# Patient Record
Sex: FEMALE | Race: WHITE | Employment: UNEMPLOYED | URBAN - METROPOLITAN AREA
[De-identification: names, ages, dates, MRNs, and addresses within clinical notes are randomized per-mention and may not be internally consistent; named-entity substitution may affect disease eponyms.]

---

## 2018-10-04 LAB
HBV SURFACE AG SERPL QL IA: NORMAL
HIV 1+2 AB+HIV1 P24 AG SERPL QL IA: NORMAL
RUBELLA ANTIBODY IGG QUANTITATIVE: NORMAL IU/ML

## 2018-11-09 ENCOUNTER — OFFICE VISIT (OUTPATIENT)
Dept: FAMILY MEDICINE | Facility: CLINIC | Age: 33
End: 2018-11-09
Payer: COMMERCIAL

## 2018-11-09 VITALS
WEIGHT: 157.8 LBS | DIASTOLIC BLOOD PRESSURE: 86 MMHG | SYSTOLIC BLOOD PRESSURE: 131 MMHG | BODY MASS INDEX: 24.77 KG/M2 | HEIGHT: 67 IN | HEART RATE: 78 BPM | TEMPERATURE: 98.3 F

## 2018-11-09 DIAGNOSIS — L03.011 PARONYCHIA OF RIGHT THUMB: Primary | ICD-10-CM

## 2018-11-09 PROCEDURE — 99203 OFFICE O/P NEW LOW 30 MIN: CPT | Performed by: NURSE PRACTITIONER

## 2018-11-09 RX ORDER — PRENATAL VIT/IRON FUM/FOLIC AC 27MG-0.8MG
1 TABLET ORAL DAILY
COMMUNITY

## 2018-11-09 RX ORDER — CEPHALEXIN 500 MG/1
500 CAPSULE ORAL 3 TIMES DAILY
Qty: 30 CAPSULE | Refills: 0 | Status: ON HOLD | OUTPATIENT
Start: 2018-11-09 | End: 2019-02-12

## 2018-11-09 NOTE — PATIENT INSTRUCTIONS
1. Take Keflex for 10 days (three times a day).  2. Make sure you take the entire course.  3. Come back if this is not resolving in the next week or so.   4. Put hot packs on it.

## 2018-11-09 NOTE — PROGRESS NOTES
"  SUBJECTIVE:                                                      Parul Herrera is a 33 year old female who presents to clinic today for the following health issues:    Joint Pain    Onset: Monday    Description:   Location: Right thumb  Character: Throbs on and off    Intensity: moderate    Progression of Symptoms: same    Accompanying Signs & Symptoms:  Other symptoms: warmth, swelling, redness and discoloration    History:   Previous similar pain: no       Precipitating factors:   Trauma or overuse: no     Alleviating factors:  Improved by: Lancing    Therapies Tried and outcome: Lanced it     HPI: Beginning on Monday, Parul noticed the development of pain, redness, and warmth near the nail of her right thumb. No trauma recalled, although she did have a hangnail a few days before. Her symptoms intensified during the first part of this week, and last night it became extremely tender, throbbing, taut, and hot to the touch. She lanced it with a needle, and pus ran out. This brought relief, but it is still a little warm and tender today. No sign of spread (clear line at knuckle level - has not changed). No MRSA history. Of note, she is 13 weeks pregnant.     Problem list and histories reviewed & adjusted, as indicated.  Additional history: as documented    Reviewed and updated as needed this visit by clinical staff  Tobacco  Allergies  Meds  Problems  Med Hx  Surg Hx  Fam Hx  Soc Hx        Reviewed and updated as needed this visit by Provider  Allergies  Meds  Problems         ROS:  Constitutional, musculoskeletal, skin systems are negative, except as otherwise noted.    OBJECTIVE:                                                      /86 (BP Location: Right arm, Patient Position: Chair, Cuff Size: Adult Regular)  Pulse 78  Temp 98.3  F (36.8  C) (Tympanic)  Ht 5' 7.32\" (1.71 m)  Wt 157 lb 12.8 oz (71.6 kg)  LMP  (LMP Unknown)  Breastfeeding? No  BMI 24.48 kg/m2 Body mass index is 24.48 kg/(m^2). "   GENERAL: healthy, alert, well nourished, well hydrated, no distress  SKIN: Right thumb (lateral aspect) - warm, erythematous region with well-demarcated border starting at knuckle and progressing distally to tip of thumb. Extends medially across base of nail. Not enough purulent material to justify drainage today (she states she milked it all out last night). No evidence of expansion of infection proximally.     Diagnostic test results:  none     ASSESSMENT/PLAN:                                                      Parul was seen today for thumb pain and redness. Presentation consistent with paronychia. I & D not indicated today (no longer fluctuant). Will treat with Keflex for 10 days (no concern for MRSA at this point). Checked for safety in pregnancy. Recommended hot-packing area. She agrees to come back if this is not looking better by the end of next week (or sooner if she develops fever / chills or it appears that the infection is spreading). Discussed risks, benefits, alternatives, and potential side effects of new medication / treatment. Agrees with plan of care. All questions answered.     Diagnoses and all orders for this visit:    Paronychia of right thumb  -     cephALEXin (KEFLEX) 500 MG capsule; Take 1 capsule (500 mg) by mouth 3 times daily    Risks, benefits and alternatives of treatments discussed. Plan agreed upon and all questions answered.      Follow-Up: Return in about 10 days (around 11/19/2018).    See Patient Instructions      LADAN Saini, CNP

## 2018-11-09 NOTE — MR AVS SNAPSHOT
"              After Visit Summary   11/9/2018    Parul Herrera    MRN: [id number]           Patient Information     Date Of Birth          1985        Visit Information        Provider Department      11/9/2018 3:00 PM Boo Rodriguez NP [facility]        Today's Diagnoses     Paronychia of right thumb    -  1      Care Instructions    1. Take Keflex for 10 days (three times a day).  2. Make sure you take the entire course.  3. Come back if this is not resolving in the next week or so.   4. Put hot packs on it.           Follow-ups after your visit        Follow-up notes from your care team     Return in about 10 days (around 11/19/2018).      Who to contact     If you have questions or need follow up information about today's clinic visit or your schedule please contact [facility] directly at [phone].  Normal or non-critical lab and imaging results will be communicated to you by MyChart, letter or phone within 4 business days after the clinic has received the results. If you do not hear from us within 7 days, please contact the clinic through [employer] or phone. If you have a critical or abnormal lab result, we will notify you by phone as soon as possible.  Submit refill requests through [employer] or call your pharmacy and they will forward the refill request to us. Please allow 3 business days for your refill to be completed.          Additional Information About Your Visit        MyChart Information     [employer] lets you send messages to your doctor, view your test results, renew your prescriptions, schedule appointments and more. To sign up, go to [email or web address] . Click on \"Log in\" on the left side of the screen, which will take you to the Welcome page. Then click on \"Sign up Now\" on the right side of the page.     You will be asked to enter the access code listed below, as well as some personal information. Please follow the directions to create your " "username and password.     Your access code is: [email or web address]  Expires: 2019  3:22 PM     Your access code will  in 90 days. If you need help or a new code, please call your [facility] clinic or [phone].        Care EveryWhere ID     This is your Care EveryWhere ID. This could be used by other organizations to access your [facility] medical records  [id number]        Your Vitals Were     Pulse Temperature Height Last Period Breastfeeding? BMI (Body Mass Index)    78 98.3  F (36.8  C) (Tympanic) 5' 7.32\" (1.71 m) (LMP Unknown) No 24.48 kg/m2       Blood Pressure from Last 3 Encounters:   18 131/86    Weight from Last 3 Encounters:   18 157 lb 12.8 oz (71.6 kg)              Today, you had the following     No orders found for display         Today's Medication Changes          These changes are accurate as of 18  3:22 PM.  If you have any questions, ask your nurse or doctor.               Start taking these medicines.        Dose/Directions    cephALEXin 500 MG capsule   Commonly known as:  KEFLEX   Used for:  Paronychia of right thumb   Started by:  Boo Rodriguez NP        Dose:  500 mg   Take 1 capsule (500 mg) by mouth 3 times daily   Quantity:  30 capsule   Refills:  0            Where to get your medicines      These medications were sent to [facility] [place] [address], [place] [address]     Phone:  [phone]     cephALEXin 500 MG capsule                Primary Care Provider Office Phone # Fax #    Boo Rodriguez [phone]       [address] [place] [address] [address]  [place] [address]        Equal Access to Services     YENI TIERNEY : Hadii bonnie Huynh, waaxda luqadaha, qaybta kaalmada klaudiayaleidy, nam jay. So [facility] [phone].    ATENCIÓN: Si habla español, tiene a fenton disposición servicios gratuitos de asistencia lingüística. Llame al " [phone].    We comply with applicable federal civil rights laws and [place] laws. We do not discriminate on the basis of race, color, national origin, age, disability, sex, sexual orientation, or gender identity.            Thank you!     Thank you for choosing [facility] [place]  for your care. Our goal is always to provide you with excellent care. Hearing back from our patients is one way we can continue to improve our services. Please take a few minutes to complete the written survey that you may receive in the mail after your visit with us. Thank you!             Your Updated Medication List - Protect others around you: Learn how to safely use, store and throw away your medicines at www.disposemymeds.org.          This list is accurate as of 11/9/18  3:22 PM.  Always use your most recent med list.                   Brand Name Dispense Instructions for use Diagnosis    cephALEXin 500 MG capsule    KEFLEX    30 capsule    Take 1 capsule (500 mg) by mouth 3 times daily    Paronychia of right thumb       prenatal multivitamin plus iron 27-0.8 MG Tabs per tablet      Take 1 tablet by mouth daily

## 2018-11-14 ENCOUNTER — HEALTH MAINTENANCE LETTER (OUTPATIENT)
Age: 33
End: 2018-11-14

## 2019-01-29 ENCOUNTER — PRE VISIT (OUTPATIENT)
Dept: MATERNAL FETAL MEDICINE | Facility: CLINIC | Age: 34
End: 2019-01-29

## 2019-01-29 ENCOUNTER — HOSPITAL ENCOUNTER (OUTPATIENT)
Dept: ULTRASOUND IMAGING | Facility: CLINIC | Age: 34
Discharge: HOME | End: 2019-01-29
Attending: INTERNAL MEDICINE | Admitting: INTERNAL MEDICINE
Payer: COMMERCIAL

## 2019-01-29 ENCOUNTER — OFFICE VISIT (OUTPATIENT)
Dept: MATERNAL FETAL MEDICINE | Facility: CLINIC | Age: 34
End: 2019-01-29
Attending: OBSTETRICS & GYNECOLOGY
Payer: COMMERCIAL

## 2019-01-29 ENCOUNTER — TRANSCRIBE ORDERS (OUTPATIENT)
Dept: MATERNAL FETAL MEDICINE | Facility: CLINIC | Age: 34
End: 2019-01-29

## 2019-01-29 DIAGNOSIS — O26.90 PREGNANCY RELATED CONDITION, ANTEPARTUM: Primary | ICD-10-CM

## 2019-01-29 DIAGNOSIS — O35.CXX0 PLEURAL EFFUSION, FETAL, AFFECTING CARE OF MOTHER, ANTEPARTUM: Primary | ICD-10-CM

## 2019-01-29 DIAGNOSIS — O26.90 PREGNANCY RELATED CONDITION, ANTEPARTUM: ICD-10-CM

## 2019-01-29 PROCEDURE — 76821 MIDDLE CEREBRAL ARTERY ECHO: CPT

## 2019-01-29 PROCEDURE — 76811 OB US DETAILED SNGL FETUS: CPT

## 2019-01-29 PROCEDURE — 96372 THER/PROPH/DIAG INJ SC/IM: CPT | Mod: ZF

## 2019-01-29 PROCEDURE — 25000128 H RX IP 250 OP 636: Mod: ZF | Performed by: OBSTETRICS & GYNECOLOGY

## 2019-01-29 RX ORDER — BETAMETHASONE SODIUM PHOSPHATE AND BETAMETHASONE ACETATE 3; 3 MG/ML; MG/ML
12 INJECTION, SUSPENSION INTRA-ARTICULAR; INTRALESIONAL; INTRAMUSCULAR; SOFT TISSUE ONCE
Status: COMPLETED | OUTPATIENT
Start: 2019-01-29 | End: 2019-01-29

## 2019-01-29 RX ADMIN — BETAMETHASONE SODIUM PHOSPHATE AND BETAMETHASONE ACETATE 12 MG: 3; 3 INJECTION, SUSPENSION INTRA-ARTICULAR; INTRALESIONAL; INTRAMUSCULAR at 16:20

## 2019-01-30 ENCOUNTER — HOSPITAL ENCOUNTER (OUTPATIENT)
Dept: ULTRASOUND IMAGING | Facility: CLINIC | Age: 34
Discharge: HOME | End: 2019-01-30
Attending: OBSTETRICS & GYNECOLOGY | Admitting: OBSTETRICS & GYNECOLOGY
Payer: COMMERCIAL

## 2019-01-30 ENCOUNTER — OFFICE VISIT (OUTPATIENT)
Dept: MATERNAL FETAL MEDICINE | Facility: CLINIC | Age: 34
End: 2019-01-30
Attending: OBSTETRICS & GYNECOLOGY
Payer: COMMERCIAL

## 2019-01-30 ENCOUNTER — HOSPITAL ENCOUNTER (OUTPATIENT)
Dept: CARDIOLOGY | Facility: CLINIC | Age: 34
End: 2019-01-30
Attending: OBSTETRICS & GYNECOLOGY | Admitting: OBSTETRICS & GYNECOLOGY
Payer: COMMERCIAL

## 2019-01-30 DIAGNOSIS — O35.CXX0 PLEURAL EFFUSION, FETAL, AFFECTING CARE OF MOTHER, ANTEPARTUM: ICD-10-CM

## 2019-01-30 DIAGNOSIS — O35.CXX0 PLEURAL EFFUSION, FETAL, AFFECTING CARE OF MOTHER, ANTEPARTUM: Primary | ICD-10-CM

## 2019-01-30 PROCEDURE — 96372 THER/PROPH/DIAG INJ SC/IM: CPT | Mod: ZF

## 2019-01-30 PROCEDURE — 96372 THER/PROPH/DIAG INJ SC/IM: CPT

## 2019-01-30 PROCEDURE — 76825 ECHO EXAM OF FETAL HEART: CPT

## 2019-01-30 PROCEDURE — 76815 OB US LIMITED FETUS(S): CPT

## 2019-01-30 PROCEDURE — 25000128 H RX IP 250 OP 636: Mod: ZF | Performed by: OBSTETRICS & GYNECOLOGY

## 2019-01-30 RX ORDER — BETAMETHASONE SODIUM PHOSPHATE AND BETAMETHASONE ACETATE 3; 3 MG/ML; MG/ML
12 INJECTION, SUSPENSION INTRA-ARTICULAR; INTRALESIONAL; INTRAMUSCULAR; SOFT TISSUE ONCE
Status: DISCONTINUED | OUTPATIENT
Start: 2019-01-30 | End: 2019-01-30

## 2019-01-30 RX ADMIN — BETAMETHASONE SODIUM PHOSPHATE AND BETAMETHASONE ACETATE 12 MG: 3; 3 INJECTION, SUSPENSION INTRA-ARTICULAR; INTRALESIONAL; INTRAMUSCULAR at 16:11

## 2019-01-30 NOTE — PROGRESS NOTES
"Please see \"Imaging\" tab under \"Chart Review\" for details of today's US.    Nanci Alcazar, DO    "

## 2019-01-31 ENCOUNTER — HOSPITAL ENCOUNTER (OUTPATIENT)
Facility: CLINIC | Age: 34
Discharge: HOME | End: 2019-01-31
Attending: OBSTETRICS & GYNECOLOGY | Admitting: OBSTETRICS & GYNECOLOGY
Payer: COMMERCIAL

## 2019-01-31 ENCOUNTER — HOSPITAL ENCOUNTER (OUTPATIENT)
Dept: ULTRASOUND IMAGING | Facility: CLINIC | Age: 34
End: 2019-01-31
Payer: COMMERCIAL

## 2019-01-31 ENCOUNTER — ANESTHESIA (OUTPATIENT)
Dept: OBGYN | Facility: CLINIC | Age: 34
End: 2019-01-31
Payer: COMMERCIAL

## 2019-01-31 ENCOUNTER — ANESTHESIA EVENT (OUTPATIENT)
Dept: OBGYN | Facility: CLINIC | Age: 34
End: 2019-01-31
Payer: COMMERCIAL

## 2019-01-31 VITALS
DIASTOLIC BLOOD PRESSURE: 58 MMHG | HEIGHT: 68 IN | BODY MASS INDEX: 25.01 KG/M2 | OXYGEN SATURATION: 100 % | WEIGHT: 165 LBS | RESPIRATION RATE: 16 BRPM | HEART RATE: 88 BPM | SYSTOLIC BLOOD PRESSURE: 110 MMHG | TEMPERATURE: 97.3 F

## 2019-01-31 LAB
ABO + RH BLD: NORMAL
ABO + RH BLD: NORMAL
APPEARANCE FLD: NORMAL
BLD GP AB SCN SERPL QL: NORMAL
BLOOD BANK CMNT PATIENT-IMP: NORMAL
COLOR FLD: YELLOW
ERYTHROCYTE [DISTWIDTH] IN BLOOD BY AUTOMATED COUNT: 12.8 % (ref 10–15)
HCT VFR BLD AUTO: 35.9 % (ref 35–47)
HGB BLD-MCNC: 12 G/DL (ref 11.7–15.7)
LYMPHOCYTES NFR FLD MANUAL: 24 %
MCH RBC QN AUTO: 29 PG (ref 26.5–33)
MCHC RBC AUTO-ENTMCNC: 33.4 G/DL (ref 31.5–36.5)
MCV RBC AUTO: 87 FL (ref 78–100)
MISCELLANEOUS TEST: NORMAL
MONOS+MACROS NFR FLD MANUAL: 76 %
PLATELET # BLD AUTO: 246 10E9/L (ref 150–450)
RBC # BLD AUTO: 4.14 10E12/L (ref 3.8–5.2)
SPECIMEN EXP DATE BLD: NORMAL
SPECIMEN SOURCE FLD: NORMAL
WBC # BLD AUTO: 24.2 10E9/L (ref 4–11)
WBC # FLD AUTO: 2130 /UL

## 2019-01-31 PROCEDURE — 84999 UNLISTED CHEMISTRY PROCEDURE: CPT | Mod: 91 | Performed by: OBSTETRICS & GYNECOLOGY

## 2019-01-31 PROCEDURE — 40000169 ZZH STATISTIC PRE-PROCEDURE ASSESSMENT I: Performed by: OBSTETRICS & GYNECOLOGY

## 2019-01-31 PROCEDURE — 88275 CYTOGENETICS 100-300: CPT | Mod: 91 | Performed by: OBSTETRICS & GYNECOLOGY

## 2019-01-31 PROCEDURE — 86850 RBC ANTIBODY SCREEN: CPT | Performed by: STUDENT IN AN ORGANIZED HEALTH CARE EDUCATION/TRAINING PROGRAM

## 2019-01-31 PROCEDURE — 71000012 ZZH RECOVERY PHASE 1 LEVEL 1 FIRST HR: Performed by: OBSTETRICS & GYNECOLOGY

## 2019-01-31 PROCEDURE — 37000009 ZZH ANESTHESIA TECHNICAL FEE, EACH ADDTL 15 MIN: Performed by: OBSTETRICS & GYNECOLOGY

## 2019-01-31 PROCEDURE — 88291 CYTO/MOLECULAR REPORT: CPT | Performed by: OBSTETRICS & GYNECOLOGY

## 2019-01-31 PROCEDURE — 99214 OFFICE O/P EST MOD 30 MIN: CPT | Performed by: NURSE PRACTITIONER

## 2019-01-31 PROCEDURE — 87529 HSV DNA AMP PROBE: CPT | Performed by: OBSTETRICS & GYNECOLOGY

## 2019-01-31 PROCEDURE — 37000008 ZZH ANESTHESIA TECHNICAL FEE, 1ST 30 MIN: Performed by: OBSTETRICS & GYNECOLOGY

## 2019-01-31 PROCEDURE — 87070 CULTURE OTHR SPECIMN AEROBIC: CPT | Performed by: OBSTETRICS & GYNECOLOGY

## 2019-01-31 PROCEDURE — 36000059 ZZH SURGERY LEVEL 3 EA 15 ADDTL MIN UMMC: Performed by: OBSTETRICS & GYNECOLOGY

## 2019-01-31 PROCEDURE — 86901 BLOOD TYPING SEROLOGIC RH(D): CPT | Performed by: STUDENT IN AN ORGANIZED HEALTH CARE EDUCATION/TRAINING PROGRAM

## 2019-01-31 PROCEDURE — 87798 DETECT AGENT NOS DNA AMP: CPT | Performed by: OBSTETRICS & GYNECOLOGY

## 2019-01-31 PROCEDURE — 81265 STR MARKERS SPECIMEN ANAL: CPT

## 2019-01-31 PROCEDURE — 59074 FETAL FLUID DRAINAGE W/US: CPT | Performed by: OBSTETRICS & GYNECOLOGY

## 2019-01-31 PROCEDURE — 81229 CYTOG ALYS CHRML ABNR SNPCGH: CPT | Performed by: OBSTETRICS & GYNECOLOGY

## 2019-01-31 PROCEDURE — 36415 COLL VENOUS BLD VENIPUNCTURE: CPT | Performed by: STUDENT IN AN ORGANIZED HEALTH CARE EDUCATION/TRAINING PROGRAM

## 2019-01-31 PROCEDURE — 86900 BLOOD TYPING SEROLOGIC ABO: CPT | Performed by: STUDENT IN AN ORGANIZED HEALTH CARE EDUCATION/TRAINING PROGRAM

## 2019-01-31 PROCEDURE — 36000057 ZZH SURGERY LEVEL 3 1ST 30 MIN - UMMC: Performed by: OBSTETRICS & GYNECOLOGY

## 2019-01-31 PROCEDURE — 89051 BODY FLUID CELL COUNT: CPT | Performed by: OBSTETRICS & GYNECOLOGY

## 2019-01-31 PROCEDURE — 85027 COMPLETE CBC AUTOMATED: CPT | Performed by: STUDENT IN AN ORGANIZED HEALTH CARE EDUCATION/TRAINING PROGRAM

## 2019-01-31 PROCEDURE — 25000128 H RX IP 250 OP 636: Performed by: STUDENT IN AN ORGANIZED HEALTH CARE EDUCATION/TRAINING PROGRAM

## 2019-01-31 PROCEDURE — 88271 CYTOGENETICS DNA PROBE: CPT | Performed by: OBSTETRICS & GYNECOLOGY

## 2019-01-31 RX ORDER — SODIUM CHLORIDE, SODIUM LACTATE, POTASSIUM CHLORIDE, CALCIUM CHLORIDE 600; 310; 30; 20 MG/100ML; MG/100ML; MG/100ML; MG/100ML
INJECTION, SOLUTION INTRAVENOUS CONTINUOUS
Status: DISCONTINUED | OUTPATIENT
Start: 2019-01-31 | End: 2019-01-31 | Stop reason: HOSPADM

## 2019-01-31 RX ORDER — CEFAZOLIN SODIUM 1 G/3ML
1 INJECTION, POWDER, FOR SOLUTION INTRAMUSCULAR; INTRAVENOUS SEE ADMIN INSTRUCTIONS
Status: DISCONTINUED | OUTPATIENT
Start: 2019-01-31 | End: 2019-01-31 | Stop reason: HOSPADM

## 2019-01-31 RX ORDER — CEFAZOLIN SODIUM 2 G/100ML
2 INJECTION, SOLUTION INTRAVENOUS
Status: COMPLETED | OUTPATIENT
Start: 2019-01-31 | End: 2019-01-31

## 2019-01-31 RX ADMIN — SODIUM CHLORIDE, POTASSIUM CHLORIDE, SODIUM LACTATE AND CALCIUM CHLORIDE: 600; 310; 30; 20 INJECTION, SOLUTION INTRAVENOUS at 12:06

## 2019-01-31 RX ADMIN — CEFAZOLIN SODIUM 2 G: 2 INJECTION, SOLUTION INTRAVENOUS at 12:07

## 2019-01-31 ASSESSMENT — MIFFLIN-ST. JEOR: SCORE: 1511.94

## 2019-01-31 NOTE — PLAN OF CARE
Pt and  to BP for fetal surgery r/t pleural effusion. [place] to room, call light, food and ordering, BOR, monitoring, IV start, lab draw, use of white board. Enc pt and  to ask questions. Complete intake paperwork. MDs in for US and to discuss plan, NNP here now discussing 25 wk babies.

## 2019-01-31 NOTE — PLAN OF CARE
Pt radha procedure well, delighted to still be pregnant. Initial mild cramping, none now. Ate lunch, void without difficulty. IV saline locked.  and father in law at bedside.

## 2019-01-31 NOTE — OP NOTE
Fetal Thoracentesis / Amniocentesis    Pre-Operative Diagnosis:   32 yo  @ 25w0d with fetal bilateral pleural effusions seen on ultrasound    Post-Operative Diagnosis:   Same, good FHT (140s) at conclusion of the procedure    Procedure: Fetal thoracentesis, Amniocentesis  Surgeon: Dr. Sethi  Assist: Dr. Michelle Walsh MD, MFM fellow  Anesth: vecuronium (0.09 mg IM to fetal buttocks)  EBL: 0cc  Specimen: amniotic fluid, pleural fluid  Proph: SCDs   Findings: cephalic infant, anterior/fundal placenta    Indications: This is a 34yo  at 25w0d  who was found to have bilateral fetal hydrothorax on ultrasound.  She has already had negative CMV testing. Given this finding diagnostic testing with thoracentesis and amniocentesis was discussed with the patient. After review of risks/benefits, the patient decided to proceed with thoracentesis and amniocentesis, with possible  section in the event of non-reassuring fetal status. Risks were reviewed, questions answered and consent signed for the procedure and genetic testing.     Procedure: The patient was taken to the OR. She was prepped and draped in the normal sterile fashion, dorsal supine position. The patient received 1g of Ancef pre-procedure. Under ultrasound guidance, the fetus was noted to be in cephalic presentation. Using a 22 gauge spinal needle under ultrasound guidance with a needle guide, the needle was first advanced percutaneously, through the uterus and into the fetal buttocks. 0.09 mg of vecuronium was injected to obtain adequate fetal paralysis. The needle was then withdrawn back to the level of the amniotic fluid and repositioned to perform the fetal thoracentesis for which approximately 20 ml of straw colored yellow pleural fluid was obtained. The fluid was sent for cell count and differential. The needle was then withdrawn and the amniocentesis was performed with a new 22 gauge needle for ~50mL of clear yellow  fluid. This was sent for genetic and viral testing. The needle was removed under ultrasound guidance. FHT was noted to be 140s post procedure. Binh Sethi and Monique were present and scrubbed for the procedure.     Erika Walsh MD    Physician Attestation   I was present and participated in the entire procedure of fetal thoracentesis and amniocentesis.    Josef Sethi  Date of Service (when I saw the patient): 1/31/19

## 2019-01-31 NOTE — ANESTHESIA PREPROCEDURE EVALUATION
"Anesthesia Pre-Procedure Evaluation    Patient: Parul Herrera   MRN:     [id number] Gender:   female   Age:    33 year old :      1985        Preoperative Diagnosis: Pregnancy   Procedure(s):  FETAL INJECTION W/ OR W/O ULTRASOUND GUIDANCE     No past medical history on file.   No past surgical history on file.       Anesthesia Evaluation     .             ROS/MED HX    ENT/Pulmonary:  - neg pulmonary ROS     Neurologic:  - neg neurologic ROS     Cardiovascular:  - neg cardiovascular ROS       METS/Exercise Tolerance:     Hematologic:  - neg hematologic  ROS       Musculoskeletal:  - neg musculoskeletal ROS       GI/Hepatic:  - neg GI/hepatic ROS       Renal/Genitourinary:         Endo:  - neg endo ROS       Psychiatric:  - neg psychiatric ROS       Infectious Disease:  - neg infectious disease ROS       Malignancy:         Other:                         PHYSICAL EXAM:   Mental Status/Neuro: A/A/O   Airway: Facies: Feasible  Mallampati: I  Mouth/Opening: Full  TM distance: > 6 cm  Neck ROM: Full   Respiratory:    CV:    Comments:                    No results found for: WBC, HGB, HCT, PLT, CRP, SED, NA, POTASSIUM, CHLORIDE, CO2, BUN, CR, GLC, VEE, PHOS, MAG, ALBUMIN, PROTTOTAL, ALT, AST, GGT, ALKPHOS, BILITOTAL, BILIDIRECT, LIPASE, AMYLASE, NEFTALI, PTT, INR, FIBR, TSH, T4, T3, HCG, HCGS, CKTOTAL, CKMB, TROPN    Preop Vitals  BP Readings from Last 3 Encounters:   18 131/86    Pulse Readings from Last 3 Encounters:   18 78      Resp Readings from Last 3 Encounters:   No data found for Resp    SpO2 Readings from Last 3 Encounters:   No data found for SpO2      Temp Readings from Last 1 Encounters:   18 36.8  C (98.3  F) (Tympanic)    Ht Readings from Last 1 Encounters:   18 1.71 m (5' 7.32\")      Wt Readings from Last 1 Encounters:   18 71.6 kg (157 lb 12.8 oz)    Estimated body mass index is 24.48 kg/m  as calculated from the following:    Height as of 18: 1.71 m (5' 7.32\").    " Weight as of 11/9/18: 71.6 kg (157 lb 12.8 oz).     LDA:            Assessment:   ASA SCORE: 2       Documentation: H&P complete; Preop Testing complete; Consents complete   Proceeding: Proceed without further delay  Tobacco Use:  Active user of Tobacco     Plan:   Anes. Type:  Anesthesia Standby   Pre-Induction: None   Induction:  Not applicable   Airway: Native Airway   Access/Monitoring: No Access Planned   Maintenance: Not Applicable   Emergence: Not Applicable   Logistics: Postop directly to Phase 2 (or similar)     PONV Management: NO PONV Prevention NeededAdult Risk Factors: Female                       Lucius Hurd MD

## 2019-01-31 NOTE — H&P
"Maternal Fetal Medicine H&P    HPI: Parul is a 32 y/o  at 25w0d by 8w0d ultrasound whose pregnancy is c/b fetal bilateral pleural effusions seen on US on . No evidence of hydrops and normal MCA. Pregnancy has otherwise been uncomplicated. She presents today for diagnostic fetal thoracentesis and amniocentesis.    Pregnancy notable for  - Fetal bilateral pleural effusions  - Dilated renal pelvises on 20 week US, no adequately seen on US on     OB hx:  3/2017: Term , 7lbs 15oz no complications. Baby had no red reflex, saw outpatient ophtho and had full eval at [place]. Dx with Urias syndrome, de petr mutation. Had  corneal surgery and vision is restored, wears glasses.    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    O:  Vitals:    19 1024   BP: 131/83   Pulse: 93   Resp: 16   Temp: 97.3  F (36.3  C)   TempSrc: Oral   Weight: 74.8 kg (165 lb)   Height: 1.727 m (5' 8\")     Gen: sitting up, NAD  CV: RRR, no murmurs  Pulm: CTAB, no wheezes or crackles  Abd: gravid, soft, non-distended, non-tender  Ext: no edema, no calf tenderness b/l    FHT: baseline 150, mod lisbet, no accels, no decels. Appropriate for gestational age  [place]: no contractions    Labs:  CMV IgG and IgM negative  RPR NR  KB negative  Rh pos, antibody negative    Imaging  Fetal ECHO: Normal right and left ventricular size and function. Normal fetal cardiac anatomy. There are large bilateral pleural effusions. No pericardial effusion. Fetal heart rate is regular at 150 bpm. Results were discussed with the family.    A/P: 33 year old  at 25w0d admitted for diagnostic fetal thoracentesis and amniocentesis    # Fetal bilateral pleural effusions, no evidence of hydrops on US  Previously discussed primary vs secondary pathogenesis. Recommended fetal " thoracentesis for diagnostic purposes as well as amniocentesis to evaluate for aneuploidy or other genetic abnormalities which may result in pleural effusions.  - s/p BMZ course (-)  - Infectious work-up thus far negative (parvovirus eval still pending)  - Fetal ECHO on  normal  - Plan for procedure under local. Anesthesia notified, if needed.  - Vecoronium for fetal paralysis prior to thoracentesis  - Reviewed risks, benefits and alternatives of procedure. Informed consent signed. Genetic consent form also signed.  - Will observe for minimum of 4 hours post procedure, continuous fetal monitoring and toco.  - Plan weekly US for evaluation of fetal hydrothorax      Patient seen and plan discussed with Dr. Navdeep Man MD  OB/GYN Resident, PGY-3  2019    Physician Attestation   IJosef, saw this patient with the resident and agree with the resident/fellow's findings and plan of care as documented in the note.      I personally reviewed vital signs, medications, labs, imaging and EFM.    Key findings: In summary, Parul Herrera is a  at 25w0d admitted with bilateral fetal hydrothorax.  Given this finding, will plan to proceed with amniocentesis and thoracentesis today to further evaluate pleural fluid for cell count and plan amniocentesis as well.  Plan discussed with patient and informed consent obtained.      Josef Sethi MD  Date of Service (when I saw the patient): 19    Time Spent on this Encounter   IJosef, spent a total of 50 minutes bedside and on the inpatient unit today managing the care of Parul Herrera.  Over 50% of my time on the unit was spent counseling the patient and /or coordinating care regarding pregnancy complicated by bilateral fetal hydrothorax. See note for details.    Josef Sethi

## 2019-01-31 NOTE — CONSULTS
Neonatology Antepartum Counseling Consults  I was asked to provide antepartum counseling for Parul Herrera at the request of Naina Sethi MD secondary to known fetal pleural effusions, 25 weeks gestation. Betamethasone was administered x2 doses. Ms. Herrera, accompanied by her , was counseled on the expected hospital course, potential risks, and outcomes associated with an infant born at approximately 25 weeks gestation. The counseling included: morbidity, mortality, initial delivery room stabilization, respiratory course, lung development, patent ductus arteriosus, hemodynamic support, infection (including NEC), intraventricular hemorrhage, nutrition, growth and development, and long term outcomes. Please feel free to call with any additional questions or concerns.          Letty PICKERING CNP, 2019 1:47 PM  [facility]   Intensive Care Unit      Floor Time (min): 5  Face to Face Time (min): 30  Total Time (minutes): 35  More than 50% of my time was spent in direct, face to face, antepartum counseling with the above patient.

## 2019-01-31 NOTE — ANESTHESIA POSTPROCEDURE EVALUATION
Anesthesia POST Procedure Evaluation    Patient: Parul Herrera   MRN:     [id number] Gender:   female   Age:    33 year old :      1985        Preoperative Diagnosis: Pregnancy   Procedure(s):  FETAL INJECTION W/ OR W/O ULTRASOUND GUIDANCE   Postop Comments: No value filed.       Anesthesia Type:  Anesthesia Standby    Reportable Event: NO     PAIN: Uncomplicated   Sign Out status: Comfortable, Well controlled pain     PONV: No PONV   Sign Out status:  No Nausea or Vomiting     Neuro/Psych: Uneventful perioperative course   Sign Out Status: Preoperative baseline; Age appropriate mentation     Airway/Resp.: Uneventful perioperative course   Sign Out Status: Non labored breathing, age appropriate RR; Resp. Status within EXPECTED Parameters     CV: Uneventful perioperative course   Sign Out status: Appropriate BP and perfusion indices; Appropriate HR/Rhythm     Disposition:   Sign Out in:  PACU  Disposition:  Phase II; Home  Recovery Course: Uneventful  Follow-Up: Not required           Last Anesthesia Record Vitals:  CRNA VITALS  2019 1249 - 2019 1349      2019             Pulse:  92    SpO2:  98 %          Last PACU/Preop Vitals:  Vitals:    19 1024 19 1342   BP: 131/83 125/74   Pulse: 93 88   Resp: 16 16   Temp: 36.3  C (97.3  F) 36.5  C (97.7  F)         Electronically Signed By: Stacy Gomez MD, 2019, 1:55 PM

## 2019-02-01 ENCOUNTER — TELEPHONE (OUTPATIENT)
Dept: MATERNAL FETAL MEDICINE | Facility: CLINIC | Age: 34
End: 2019-02-01

## 2019-02-01 DIAGNOSIS — O35.CXX0 PLEURAL EFFUSION OF FETUS AFFECTING MANAGEMENT OF MOTHER, ANTEPARTUM: Primary | ICD-10-CM

## 2019-02-01 NOTE — DISCHARGE INSTRUCTIONS
Discharge Instruction for Undelivered Patients      You were seen for: fetal thorocentesis  We Consulted: Dr. Sethi  You had (Test or Medicine): fetal thorocentesis     Diet:   Drink 8 to 12 glasses of liquids (milk, juice, water) every day.     Activity:  Call your doctor or nurse midwife if your baby is moving less than usual.     Call your provider if you notice:  Swelling in your face or increased swelling in your hands or legs.  Headaches that are not relieved by Tylenol (acetaminophen).  Changes in your vision (blurring: seeing spots or stars.)  Nausea (sick to your stomach) and vomiting (throwing up).   Weight gain of 5 pounds or more per week.  Heartburn that doesn't go away.  Signs of bladder infection: pain when you urinate (use the toilet), need to go more often and more urgently.  The bag of marino (rupture of membranes) breaks, or you notice leaking in your underwear.  Bright red blood in your underwear.  Abdominal (lower belly) or stomach pain.  For first baby: Contractions (tightening) less than 5 minutes apart for one hour or more.  Second (plus) baby: Contractions (tightening) less than 10 minutes apart and getting stronger.  *If less than 34 weeks: Contractions (tightenings) more than 6 times in one hour.  Increase or change in vaginal discharge (note the color and amount)  Other:     Follow-up:  [facility] care coordinator will call you with the time and place of your ultrasound.      If any issues arise: please call the following numbers, during regular office hours call clinic at [phone] and during after hours call [phone].

## 2019-02-01 NOTE — TELEPHONE ENCOUNTER
Spoke with GC assistant at [facility] ([place] and requested a culture and hold cells on amniotic fluid for potential additional testing.    Erika Bey MS, [facility]  Maternal Fetal Medicine  [facility]  Phone:[phone]  Email: [email or web address]

## 2019-02-01 NOTE — PLAN OF CARE
Dr. Sethi here and discussed the results with Parul, her  and father-in-law. Plan will be to have an US Monday. Couple grieving appropriately. Patient given verbal and written discharge instructions.

## 2019-02-03 LAB
RESULT: NORMAL
SEND OUTS MISC TEST CODE: NORMAL
SEND OUTS MISC TEST SPECIMEN: NORMAL
TEST NAME: NORMAL

## 2019-02-04 ENCOUNTER — OFFICE VISIT (OUTPATIENT)
Dept: MATERNAL FETAL MEDICINE | Facility: CLINIC | Age: 34
End: 2019-02-04
Attending: OBSTETRICS & GYNECOLOGY
Payer: COMMERCIAL

## 2019-02-04 ENCOUNTER — HOSPITAL ENCOUNTER (OUTPATIENT)
Dept: ULTRASOUND IMAGING | Facility: CLINIC | Age: 34
Discharge: HOME | End: 2019-02-04
Attending: OBSTETRICS & GYNECOLOGY | Admitting: OBSTETRICS & GYNECOLOGY
Payer: COMMERCIAL

## 2019-02-04 DIAGNOSIS — O35.CXX0 PLEURAL EFFUSION, FETAL, AFFECTING CARE OF MOTHER, ANTEPARTUM: Primary | ICD-10-CM

## 2019-02-04 DIAGNOSIS — O35.CXX0 PLEURAL EFFUSION OF FETUS AFFECTING MANAGEMENT OF MOTHER, ANTEPARTUM: ICD-10-CM

## 2019-02-04 LAB
RESULT: NORMAL
SEND OUTS MISC TEST CODE: NORMAL
SEND OUTS MISC TEST SPECIMEN: NORMAL
SPECIMEN SOURCE: NORMAL
T GONDII DNA SPEC QL NAA+PROBE: NOT DETECTED
TEST NAME: NORMAL

## 2019-02-04 PROCEDURE — 76821 MIDDLE CEREBRAL ARTERY ECHO: CPT | Performed by: OBSTETRICS & GYNECOLOGY

## 2019-02-04 PROCEDURE — 76816 OB US FOLLOW-UP PER FETUS: CPT

## 2019-02-05 LAB
B19V DNA SER QL NAA+PROBE: NOT DETECTED
BACTERIA SPEC CULT: NO GROWTH
SPECIMEN SOURCE: NORMAL
SPECIMEN SOURCE: NORMAL

## 2019-02-07 ENCOUNTER — HOSPITAL ENCOUNTER (OUTPATIENT)
Dept: ULTRASOUND IMAGING | Facility: CLINIC | Age: 34
Discharge: HOME | End: 2019-02-07
Attending: OBSTETRICS & GYNECOLOGY | Admitting: OBSTETRICS & GYNECOLOGY
Payer: COMMERCIAL

## 2019-02-07 ENCOUNTER — TELEPHONE (OUTPATIENT)
Dept: MATERNAL FETAL MEDICINE | Facility: CLINIC | Age: 34
End: 2019-02-07

## 2019-02-07 ENCOUNTER — OFFICE VISIT (OUTPATIENT)
Dept: MATERNAL FETAL MEDICINE | Facility: CLINIC | Age: 34
End: 2019-02-07
Attending: OBSTETRICS & GYNECOLOGY
Payer: COMMERCIAL

## 2019-02-07 DIAGNOSIS — O35.CXX0 PLEURAL EFFUSION, FETAL, AFFECTING CARE OF MOTHER, ANTEPARTUM: Primary | ICD-10-CM

## 2019-02-07 DIAGNOSIS — O35.CXX0 PLEURAL EFFUSION, FETAL, AFFECTING CARE OF MOTHER, ANTEPARTUM: ICD-10-CM

## 2019-02-07 LAB
CHROM ANALY RESULT (ISCN): NORMAL
PATHOLOGY STUDY: NORMAL
SERVICE CMNT-IMP: YES
SPECIMEN SOURCE: NORMAL

## 2019-02-07 PROCEDURE — 76815 OB US LIMITED FETUS(S): CPT

## 2019-02-07 PROCEDURE — 76816 OB US FOLLOW-UP PER FETUS: CPT

## 2019-02-07 PROCEDURE — 76821 MIDDLE CEREBRAL ARTERY ECHO: CPT | Performed by: OBSTETRICS & GYNECOLOGY

## 2019-02-07 NOTE — TELEPHONE ENCOUNTER
Called Parul and reviewed normal microarray results.  No significant copy number variants (deletions or duplications) were identified.I contacted [facility] on 2/1/19 and asked that they maintain a back-up culture.     I reviewed with Parul the option of the hydrops panel (87 gene panel through [employer]). Turnaround time is 8-10 weeks. Patient is interested and will discuss with [facility] at her appointment today.    Parul asked about the likelihood of this pregnancy also having the de petr PAX6 mutation that was identified in her daughter. Since this is a de petr mutation, I would not expect this pregnancy to be at increased risk, although there is a theoretical risk of germline mosaicism which could be associated with a recurrence risk for other siblings. I also would not expect that the ultrasound findings in the current pregnancy would be associated with the PAX6 mutation.    We reviewed that if we pursue all genetic testing options and all results are negative, we are unable to rule out the possibility of a genetic condition. Recurrence risk in subsequent pregnancy could be low or as high as 25%.    Erika Bey MS, [facility]  Maternal Fetal Medicine

## 2019-02-08 ENCOUNTER — DOCUMENTATION ONLY (OUTPATIENT)
Dept: MATERNAL FETAL MEDICINE | Facility: CLINIC | Age: 34
End: 2019-02-08

## 2019-02-08 ENCOUNTER — TELEPHONE (OUTPATIENT)
Dept: MATERNAL FETAL MEDICINE | Facility: CLINIC | Age: 34
End: 2019-02-08

## 2019-02-08 ENCOUNTER — MEDICAL CORRESPONDENCE (OUTPATIENT)
Dept: HEALTH INFORMATION MANAGEMENT | Facility: CLINIC | Age: 34
End: 2019-02-08

## 2019-02-08 DIAGNOSIS — O35.9XX0 SUSPECTED FETAL ANOMALY, ANTEPARTUM: Primary | ICD-10-CM

## 2019-02-08 NOTE — TELEPHONE ENCOUNTER
Spoke with Parul today to get her [place] name for her [facility] referral. Received info and referral sent.  Ariadna Easley RN

## 2019-02-08 NOTE — TELEPHONE ENCOUNTER
Called Parul and left a message that we were able to schedule at  appointment for her at 2 pm on Monday 2/11 before her US. Instructed patient to call back [facility] to confirm.   Ariadna Easley RN

## 2019-02-08 NOTE — PROGRESS NOTES
2/7/19    Genetic counseling briefly met with Parul after her [facility] appointment to provide contact information and discuss the availability of additional genetic testing moving forward.  Parul had an amniocentesis with a chromosome microarray, which was normal.  The testing lab [facility], has maintained a cell culture for additional testing if needed.  An additional testing option that was discussed was an expanded gene panel of 87 genes associated with fetal hydrops through [facility].  We discussed that this testing could be completed on the available sample and the results take approximately 4-6 weeks.      This testing can be conducted via next generation sequencing of 87 genes associated with fetal hydrops.  We discussed that while NGS highly accurate, it may not be able to detect all variations present in these genes. It is also possible that there are other genes associated these symptoms that have yet to be discovered. Reported results may include genetic changes that have been reported to be associated with a particular clinical symptom(s) (pathogenic variant) or may be genetic changes that have never been reported before and whose significance is unknown ( variant of unknown significance) or genetic changes that are none to not cause any clinical symptoms (benign variant). At times NGS results may be difficult to interpret and may require follow up parental/family studies or may yield results that we are not able to interpret with today's information, but may be relevant in the future as more clinical data is applied to genetic test results. Similarly, NGS may identify genetic changes in multiple genes and it may be difficult to predict what the combination of genetic changes may result in clinically.     All of Parul and her family's questions were answered to their satisfaction at this time.  Parul consented for testing but we made a plan to not order testing yet, as she may reconsider and opt  not to pursue the panel.  Parul has several appointments with other MFM specialists in the coming week and will likely make a decision regarding testing in the coming days.     Parul was provided my contact information and encouraged to contact me with any questions or concerns.   Face to face time < 10 minutes    Koko Malagon MS, [facility]  Licensed Genetic Counselor  Phone: [phone]  Pager: [phone]

## 2019-02-11 ENCOUNTER — ALLIED HEALTH/NURSE VISIT (OUTPATIENT)
Dept: MATERNAL FETAL MEDICINE | Facility: CLINIC | Age: 34
End: 2019-02-11
Attending: OBSTETRICS & GYNECOLOGY
Payer: COMMERCIAL

## 2019-02-11 ENCOUNTER — HOSPITAL ENCOUNTER (OUTPATIENT)
Dept: ULTRASOUND IMAGING | Facility: CLINIC | Age: 34
Discharge: HOME | End: 2019-02-11
Attending: OBSTETRICS & GYNECOLOGY | Admitting: OBSTETRICS & GYNECOLOGY
Payer: COMMERCIAL

## 2019-02-11 ENCOUNTER — PRENATAL OFFICE VISIT (OUTPATIENT)
Dept: CARE COORDINATION | Facility: CLINIC | Age: 34
End: 2019-02-11

## 2019-02-11 ENCOUNTER — TELEPHONE (OUTPATIENT)
Dept: MATERNAL FETAL MEDICINE | Facility: CLINIC | Age: 34
End: 2019-02-11

## 2019-02-11 DIAGNOSIS — Z71.9 ENCOUNTER FOR COUNSELING: Primary | ICD-10-CM

## 2019-02-11 DIAGNOSIS — O35.CXX0 PLEURAL EFFUSION, FETAL, AFFECTING CARE OF MOTHER, ANTEPARTUM: ICD-10-CM

## 2019-02-11 DIAGNOSIS — O35.9XX0 SUSPECTED FETAL ANOMALY, ANTEPARTUM: ICD-10-CM

## 2019-02-11 PROCEDURE — 76816 OB US FOLLOW-UP PER FETUS: CPT

## 2019-02-11 PROCEDURE — 76821 MIDDLE CEREBRAL ARTERY ECHO: CPT | Performed by: OBSTETRICS & GYNECOLOGY

## 2019-02-11 NOTE — TELEPHONE ENCOUNTER
Call [facility] [place] to confirm they new about send out to [employer]. Spoke with JOE Salamanca who confirmed they were processing amniocytes for send out to [facility] for hydrops panel. Jadyn will notify me when cell culture is sent out.    Erika Bey MS, [facility]  Maternal Fetal Medicine

## 2019-02-11 NOTE — PROGRESS NOTES
[facility]  MATERNAL CHILD HEALTH SOCIAL WORK PROGRESS NOTE    DATA:     SW consulted to meet with pt and FOB/spouse in MFM clinic to offer supportive services re: complex pregnancy.    INTERVENTION:       SW completed chart review and collaborated with the multidisciplinary team.     Introduction to Maternal Child Health  role and scope of practice.     Provided this SW business card.     Reassured family of ongoing SW supportive services available to them at the hospital. Encouraged parents to access this SW for support as needed.    ASSESSMENT:     Couple are thoughtful and strong advocates for themselves and their baby. Their moods were congruent to the presenting situation. Couple seem to be communicating well with one another. They are struggling to navigate the unknowns of this pregnancy and proactively sought SW consult for supportive services/resources/referrals. The will benefit from additional supports. They are open to and appreciative of ongoing therapeutic support, advocacy, and connection with resources.    PLAN:     SW will follow pt family throughout their Maternal Child Health journey to assess needs and provide ongoing psychosocial support and access to appropriate resources/referrals. SW will continue to collaborate with the multidisciplinary team.    GURDEEP Schwab, Health system  Clinical   Maternal Child Health  [facility]  Phone:   [phone]  Pager:    [phone]

## 2019-02-11 NOTE — PROGRESS NOTES
Please refer to ultrasound report under 'Imaging' Studies of 'Chart Review' tabs.    Eliceo Rosas M.D.

## 2019-02-11 NOTE — TELEPHONE ENCOUNTER
Parul called [facility] office and left a message saying she would not be able to meet with SW at 2pm. Writer emailed SW and they are able to see Parul following her US at 3:45. Called Parul to confirm appointment.   Ariadna Easley RN

## 2019-02-12 ENCOUNTER — ANESTHESIA (OUTPATIENT)
Dept: OBGYN | Facility: CLINIC | Age: 34
End: 2019-02-12
Payer: COMMERCIAL

## 2019-02-12 ENCOUNTER — HOSPITAL ENCOUNTER (OUTPATIENT)
Facility: CLINIC | Age: 34
Discharge: HOME | End: 2019-02-12
Attending: OBSTETRICS & GYNECOLOGY | Admitting: OBSTETRICS & GYNECOLOGY
Payer: COMMERCIAL

## 2019-02-12 ENCOUNTER — ANESTHESIA EVENT (OUTPATIENT)
Dept: OBGYN | Facility: CLINIC | Age: 34
End: 2019-02-12
Payer: COMMERCIAL

## 2019-02-12 VITALS
HEIGHT: 68 IN | WEIGHT: 164.9 LBS | TEMPERATURE: 97.6 F | BODY MASS INDEX: 24.99 KG/M2 | DIASTOLIC BLOOD PRESSURE: 85 MMHG | RESPIRATION RATE: 16 BRPM | SYSTOLIC BLOOD PRESSURE: 131 MMHG

## 2019-02-12 LAB
ABO + RH BLD: NORMAL
ABO + RH BLD: NORMAL
APPEARANCE FLD: NORMAL
BLD GP AB SCN SERPL QL: NORMAL
BLOOD BANK CMNT PATIENT-IMP: NORMAL
COLOR FLD: YELLOW
EOSINOPHIL NFR FLD MANUAL: 2 %
GLUCOSE BLDC GLUCOMTR-MCNC: 81 MG/DL (ref 70–99)
HGB BLD-MCNC: 12.3 G/DL (ref 11.7–15.7)
LYMPHOCYTES NFR FLD MANUAL: 78 %
MONOS+MACROS NFR FLD MANUAL: 18 %
NEUTS BAND NFR FLD MANUAL: 2 %
SPECIMEN EXP DATE BLD: NORMAL
SPECIMEN SOURCE FLD: NORMAL
WBC # FLD AUTO: 900 /UL

## 2019-02-12 PROCEDURE — 86850 RBC ANTIBODY SCREEN: CPT | Performed by: STUDENT IN AN ORGANIZED HEALTH CARE EDUCATION/TRAINING PROGRAM

## 2019-02-12 PROCEDURE — 25800030 ZZH RX IP 258 OP 636

## 2019-02-12 PROCEDURE — 36000059 ZZH SURGERY LEVEL 3 EA 15 ADDTL MIN UMMC: Performed by: OBSTETRICS & GYNECOLOGY

## 2019-02-12 PROCEDURE — 71000014 ZZH RECOVERY PHASE 1 LEVEL 2 FIRST HR: Performed by: OBSTETRICS & GYNECOLOGY

## 2019-02-12 PROCEDURE — 82962 GLUCOSE BLOOD TEST: CPT

## 2019-02-12 PROCEDURE — 37000009 ZZH ANESTHESIA TECHNICAL FEE, EACH ADDTL 15 MIN: Performed by: OBSTETRICS & GYNECOLOGY

## 2019-02-12 PROCEDURE — 40000170 ZZH STATISTIC PRE-PROCEDURE ASSESSMENT II: Performed by: OBSTETRICS & GYNECOLOGY

## 2019-02-12 PROCEDURE — 89051 BODY FLUID CELL COUNT: CPT | Performed by: STUDENT IN AN ORGANIZED HEALTH CARE EDUCATION/TRAINING PROGRAM

## 2019-02-12 PROCEDURE — 86900 BLOOD TYPING SEROLOGIC ABO: CPT | Performed by: STUDENT IN AN ORGANIZED HEALTH CARE EDUCATION/TRAINING PROGRAM

## 2019-02-12 PROCEDURE — 36000057 ZZH SURGERY LEVEL 3 1ST 30 MIN - UMMC: Performed by: OBSTETRICS & GYNECOLOGY

## 2019-02-12 PROCEDURE — 86901 BLOOD TYPING SEROLOGIC RH(D): CPT | Performed by: STUDENT IN AN ORGANIZED HEALTH CARE EDUCATION/TRAINING PROGRAM

## 2019-02-12 PROCEDURE — 27211024 ZZHC OR SUPPLY OTHER OPNP: Performed by: OBSTETRICS & GYNECOLOGY

## 2019-02-12 PROCEDURE — 25800030 ZZH RX IP 258 OP 636: Performed by: NURSE ANESTHETIST, CERTIFIED REGISTERED

## 2019-02-12 PROCEDURE — 37000008 ZZH ANESTHESIA TECHNICAL FEE, 1ST 30 MIN: Performed by: OBSTETRICS & GYNECOLOGY

## 2019-02-12 PROCEDURE — 85018 HEMOGLOBIN: CPT | Performed by: STUDENT IN AN ORGANIZED HEALTH CARE EDUCATION/TRAINING PROGRAM

## 2019-02-12 RX ORDER — SODIUM CHLORIDE, SODIUM LACTATE, POTASSIUM CHLORIDE, CALCIUM CHLORIDE 600; 310; 30; 20 MG/100ML; MG/100ML; MG/100ML; MG/100ML
INJECTION, SOLUTION INTRAVENOUS CONTINUOUS
Status: DISCONTINUED | OUTPATIENT
Start: 2019-02-12 | End: 2019-02-12 | Stop reason: HOSPADM

## 2019-02-12 RX ORDER — CITRIC ACID/SODIUM CITRATE 334-500MG
30 SOLUTION, ORAL ORAL
Status: DISCONTINUED | OUTPATIENT
Start: 2019-02-12 | End: 2019-02-12 | Stop reason: HOSPADM

## 2019-02-12 RX ORDER — CITRIC ACID/SODIUM CITRATE 334-500MG
SOLUTION, ORAL ORAL
Status: DISCONTINUED
Start: 2019-02-12 | End: 2019-02-12 | Stop reason: HOSPADM

## 2019-02-12 RX ORDER — LIDOCAINE 40 MG/G
CREAM TOPICAL
Status: DISCONTINUED | OUTPATIENT
Start: 2019-02-12 | End: 2019-02-12 | Stop reason: HOSPADM

## 2019-02-12 RX ORDER — SODIUM CHLORIDE, SODIUM LACTATE, POTASSIUM CHLORIDE, CALCIUM CHLORIDE 600; 310; 30; 20 MG/100ML; MG/100ML; MG/100ML; MG/100ML
INJECTION, SOLUTION INTRAVENOUS
Status: COMPLETED
Start: 2019-02-12 | End: 2019-02-12

## 2019-02-12 RX ORDER — SODIUM CHLORIDE, SODIUM LACTATE, POTASSIUM CHLORIDE, CALCIUM CHLORIDE 600; 310; 30; 20 MG/100ML; MG/100ML; MG/100ML; MG/100ML
INJECTION, SOLUTION INTRAVENOUS CONTINUOUS PRN
Status: DISCONTINUED | OUTPATIENT
Start: 2019-02-12 | End: 2019-02-12

## 2019-02-12 RX ADMIN — SODIUM CHLORIDE, POTASSIUM CHLORIDE, SODIUM LACTATE AND CALCIUM CHLORIDE: 600; 310; 30; 20 INJECTION, SOLUTION INTRAVENOUS at 15:36

## 2019-02-12 RX ADMIN — SODIUM CHLORIDE, POTASSIUM CHLORIDE, SODIUM LACTATE AND CALCIUM CHLORIDE 1000 ML: 600; 310; 30; 20 INJECTION, SOLUTION INTRAVENOUS at 14:16

## 2019-02-12 ASSESSMENT — MIFFLIN-ST. JEOR: SCORE: 1511.37

## 2019-02-12 NOTE — PROGRESS NOTES
[facility]  INITIAL MATERNAL CHILD HEALTH PSYCHOSOCIAL ASSESSMENT    DATA:     Reason for Social Work Consult: Adjustment to illness counseling, [facility] psychosocial assessment.    SW met with pt and FOB/spouse this afternoon on labor and delivery to review Maternal Child Health social work role. SW completed psychosocial assessment, and checked on any current resource/support needs.    Presenting Information: Pt is Parul Herrera ( 1985). She is a  female at 26w5d GA admitted to labor and delivery today, 19, for fetal thoracentesis. Couple are pregnant with a baby boy. Pt's pregnancy is complicated by fetal bilateral pleural effusions now with fetal hydrops present.    Living Situation: Pt family relocated from [place] to [place] in summer 2018. They purchased a home and now reside in [place]    Family Constellation: FOB/spouse is Alpesh Herrera. Couple have an older daughter together, Bing (born 2017).     Previous hospital experience: Bing was flown after birth to [place] from [place] (FOBs uncle is a physician at [employer] in [place]) due to diagnosis of Urias syndrome, de petr mutation. The diagnosis was unknown parentally. Per couple, Bing had surgery and now has restored vision and is developmentally on track.    Social Support: Main supports are pt and FOBs parents. Pt's parents have flown in from [place] to be with pt family this week and to provide childcare for Bing during medical appointments/hospitalization. FOBs parents and extended family reside mostly locally.    Employment: Pt was employed as a  in [place]. 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    FOB is employed full-time. He reports having a supportive workplace at this time, though he reports significant challenges re: communicating with /  "navigating information sharing about this complex pregnancy journey with his colleagues. At this time, his supervisor and direct colleagues have some information about the complexities of this pregnancy.    Transportation: Family report having reliable access to personal transportation at this time.     Insurance: Pt family are insured through [facility] employer insurance, [employer] [place]. They will add baby to this insurance plan.    Finances: No specific concerns noted at this time.     Mental Health History/coping: No past or current concerns noted, apart from navigating the stress and anxiety re: pregnancy complications, guarded prognosis. SW and couple discussed pattern of coping and the importance of having adequate supports, as indicated. Couple are open to SW providing hospital and community resource information to support them as they cope with this complex pregnancy.    INTERVENTION:       SW completed chart review and collaborated with the multidisciplinary team.     Completed initial psychosocial assessment with pt and FOB/spouse.    Introduction to Maternal Child Health  role and scope of practice.     Provided \"Meeting Your Basic Needs While Your Child is Hospitalized\" hand out and SW business card.     Reviewed Hospital and Community Resources     Provided supportive counseling, active empathic listening and validation of emotions.     Assessed Mental Health History and Current Symptoms     Identified stressors, barriers and family concerns.     ASSESSMENT:     Mood: appropriate  Affect: appropriate, sad/tearful  Coping: adequate  Methods of coping: Discussed diversionary activities, self regulation, self care    Couple are feeling overwhelmed, sadness and worry. They are utilizing their strengths to cope as they adjust to the reality of this pregnancy diagnosis. They are maintaining hope. They continue to dedicate their energies to doing all they can to support one another " in this difficult time. SW normalized their feelings and provided positive feedback for their insight into their coping/processing. SW encouraged that there is no right or wrong way to process the emotions they are feeling.  Validated that people cope differently. Couple appear to have good coping skills and very strong and engaged family support to help them manage and process at this time.    Level of engagement with SW: Couple were open and easy to engage. They were receptive to this SW messages and seem highly motivated and capable of following through on resources given. They remain open to and appreciative of ongoing therapeutic support, advocacy, and connection with resources throughout their Maternal-Child Health journey. Able to seek out SW as needs arise.    Family s understanding of baby s medical situation: Family appear to have a good grasp of the medical situation at this time. They are understandably overwhelmed/distressed by the uncertainties re: this pregnancy.    Assessment of Support System: stable,  involved, supportive  Parul is surrounded by a support system that urges her and Alpesh to remain hopeful, while also supporting their grief re: significant and unexpected pregnancy complications.    Resilience Factors: caring family, willingness to accept help, strong and stable support network, presence of support network, engaged in treatment planning, aware of and well connected to available community/financial resources, steady employment, financial stability, good self awareness, strong self advocate, knowledge of the medical system from older child's unexpected medical diagnosis/treatment needs after birth, commitment to health and well-being    Assessment of parental risk for PMAD: Higher than average risk given pregnancy complications.    Motivation/Ability to Access Services: Highly motivated, independent in accessing services and needed resources for support.     PLAN:       SW to meet with  couple at upcoming [facility] clinic visit to provide additional supportive services, appropriate resources/referrals; pt to contact SW when she has confirmed her appointment time.    SW will continue to assess needs and provide ongoing psychosocial support and access to appropriate resources/referrals.     SW will continue to collaborate with the multidisciplinary team.    GURDEEP Schwab, [facility]  Clinical   Maternal Child Health  [facility]  Phone:   [phone]  Pager:    [phone]

## 2019-02-12 NOTE — PROGRESS NOTES
Mary MUNROE In patients room as they are looking for ways to help with the unknown and the anxiety of the unknown and babies health. They would like pointers on how to cope. Blood sugar 81. Still no SCD machine

## 2019-02-12 NOTE — ANESTHESIA CARE TRANSFER NOTE
Patient: Parul Herrera    Procedure(s):  FETAL INJECTION W/ OR W/O ULTRASOUND GUIDANCE AND MATERNAL FETAL MEDICINE SONOGRAPHER WILL COME    Diagnosis: Fetal Pleural Effusion  Diagnosis Additional Information: No value filed.    Anesthesia Type:   No value filed.     Note:  Airway :Room Air  Patient transferred to:Labor and Delivery  Comments: PT walked out of OR, accompanied by RN and SO. Handoff Report: Identifed the Patient, Identified the Reponsible Provider, Reviewed the pertinent medical history, Discussed the surgical course, Reviewed Intra-OP anesthesia mangement and issues during anesthesia, Set expectations for post-procedure period and Allowed opportunity for questions and acknowledgement of understanding      Vitals: (Last set prior to Anesthesia Care Transfer)    CRNA VITALS  2/12/2019 1549 - 2/12/2019 1619      2/12/2019             Pulse:  101    SpO2:  98 %                Electronically Signed By: LADAN Gallego CRNA  February 12, 2019  4:19 PM

## 2019-02-12 NOTE — OP NOTE
[facility] Operative Note  Fetal Thoracentesis    Pre-Operative Diagnosis:   - 34 yo  @ 26w5d   - recurrent fetal bilateral pleural effusions seen on ultrasound     Post-Operative Diagnosis:   Same, good FHT (165) at conclusion of the procedure     Procedure: Fetal thoracentesis, Amniocentesis  Surgeon: Dr. Dev obregon  Assist: Dr. Cheyanne Echevarria  Resident: Dr. Kina Oden  Anesth: vecuronium (0.1 mg IM to fetal thigh)  EBL: 0cc  IVF: 500 ml   Specimen: pleural fluid, 76 ml removed  Proph: SCDs   Findings: breech infant, anterior/fundal placenta     Indications: This is a 32yo  at 26w5d  who was found to have recurrent bilateral fetal hydrothorax on ultrasound, s/p initial thoracentesis one week prior. After review of risks/benefits, the patient decided to proceed with thoracentesis with possible  section in the event of non-reassuring fetal status. Risks were reviewed, questions answered and consent signed for the procedure. Written consent was obtained.      Procedure: The patient was taken to the OR. She was prepped and draped in the normal sterile fashion, dorsal supine position. Under ultrasound guidance, the fetus was noted to be in breech presentation. Using a 20 gauge spinal needle under ultrasound guidance with a needle guide, the needle was first advanced percutaneously, through the uterus and into the fetal thigh. 0.1 mg of vecuronium was injected to obtain adequate fetal paralysis. A new needle was placed and positioned within thoracic cavity to perform the fetal thoracentesis for which approximately 76 ml of straw colored yellow pleural fluid was obtained. The fluid was sent for cell count and differential. The needle was removed under ultrasound guidance. FHT was noted to be 165s post procedure. Binh Obregon and Wale were present and scrubbed for the procedure.     Kina Oden MD PhD  Ob/Gyn PGY-3  2019 4:23 PM

## 2019-02-12 NOTE — H&P
[facility]  OB History and Physical      Parul Herrera MRN# [id number]   Age: 33 year old YOB: 1985     CC:  Repeat fetal thoracentesis     HPI:  Ms. Parul Herrera is a 33 year old  at 26w5d by by 8w0d US, who presents for a fetal thoracentesis. Her pregnancy is notable for fetal bilateral pleural effusions now with fetal fetal hydrops present.    She under went a thoracentesis on , now with reaccumulation of fluid. She is s/p a course of BMZ on -. Infectious work up (CMV, Parvo, HSV, toxoplasma, RPR) has been negative to date. Normal FISH result, XY.     She denies contractions, vaginal bleeding, and loss of fluid.   + normal fetal movement. She is accompanied by her partner who is at the bedside and supportive.     Pregnancy notable for  - Fetal bilateral pleural effusions, s/p right thoracentesis on 19, now reaccumulation with right side greater than left, left sided mediastinal shift  - Fetal hydrops, mild polyhydramnios (MVP 9.9) and ascites, normal UA and MCA on   - Dilated renal pelvises on 20 week US, not adequately seen on US on      OB hx:  3/2017: Term , 7lbs 15oz no complications. Baby had no red reflex, saw outpatient ophtho and had full eval at [facility]. Dx with Urias syndrome, de petr mutation. Had  corneal surgery and vision is restored, wears glasses.      Prenatal Labs:   Lab Results   Component Value Date    ABO PENDING 2019    RH Pos 2019    AS PENDING 2019    HGB 12.3 2019     PMHx:   History reviewed. No pertinent past medical history.  PSHx:   Past Surgical History:   Procedure Laterality Date     FETAL INJECTION W/ OR W/O ULTRASOUND GUIDANCE N/A 2019    Procedure: FETAL INJECTION W/ OR W/O ULTRASOUND GUIDANCE;  Surgeon: Michelle Amaya MD;  Location:  L+D     Meds:   No current outpatient medications on file.      Allergies:  No Known Allergies   FmHx: History reviewed. No pertinent family  history.  SocHx: She denies any tobacco, alcohol, or other drug use during this pregnancy.    ROS:   A 14 point review of systems was completed and was negative except for points mentioned in the HPI.     PE:  Vit:   Patient Vitals for the past 4 hrs:   BP Temp Temp src Resp   19 1413 131/79 -- -- --   19 1409 -- 98.2  F (36.8  C) Oral 20      Gen: Well-appearing, NAD, comfortable   CV: RRR, well perfused  Pulm: No increased work of breathing  Abd: Soft, gravid, non-tender  Ext: trace LE edema b/l         FHT: Baseline 155, moderate variability, 10x10 accelerations, no decelerations   [place]: no contractions in 10 minutes      Assessment  Ms. Parul Herrera is a 33 year old  at 26w5d by by 8w0d US, who presents for a fetal thoracentesis.    Plan  Repeat fetal thoracentesis due fetal bilateral pleural effusions:  - s/p BMZ course (-)  - no pre-procedure antibiotics needed   - Plan for procedure under local. Anesthesia notified, if needed.  - Vecoronium for fetal paralysis prior to thoracentesis  - Reviewed risks, benefits and alternatives of procedure. Informed consent signed. Patient aware of need for possible  delivery.   - Will observe for 1-2 hours post procedure, continuous fetal monitoring and toco. If FHT is appropriate, she can discharge to home.       FWB:   - Category appropriate for gestational age.  Continue EFM and toco    PNC:   - Rh positive, Placenta anterior     The patient was discussed with Dr. Rosas who is in agreement with the treatment plan.    Kina Oden MD PhD  Ob/Gyn PGY-3  2019 3:31 PM

## 2019-02-12 NOTE — ANESTHESIA PREPROCEDURE EVALUATION
"Anesthesia Pre-Procedure Evaluation    Patient: Parul Herrera   MRN:     [id number] Gender:   female   Age:    33 year old :      1985        Preoperative Diagnosis: Pregnancy   Procedure(s):  FETAL INJECTION W/ OR W/O ULTRASOUND GUIDANCE     No past medical history on file.   Past Surgical History:   Procedure Laterality Date     FETAL INJECTION W/ OR W/O ULTRASOUND GUIDANCE N/A 2019    Procedure: FETAL INJECTION W/ OR W/O ULTRASOUND GUIDANCE;  Surgeon: Michelle Amaya MD;  Location: [facility] L+D          Anesthesia Evaluation     .             ROS/MED HX    ENT/Pulmonary:  - neg pulmonary ROS     Neurologic:  - neg neurologic ROS     Cardiovascular:  - neg cardiovascular ROS       METS/Exercise Tolerance:     Hematologic:  - neg hematologic  ROS       Musculoskeletal:  - neg musculoskeletal ROS       GI/Hepatic:  - neg GI/hepatic ROS       Renal/Genitourinary:         Endo:  - neg endo ROS       Psychiatric:  - neg psychiatric ROS       Infectious Disease:  - neg infectious disease ROS       Malignancy:         Other:                         PHYSICAL EXAM:   Mental Status/Neuro: A/A/O   Airway: Facies: Feasible  Mallampati: I  Mouth/Opening: Full  TM distance: > 6 cm  Neck ROM: Full   Respiratory:    CV:    Comments:                    Lab Results   Component Value Date    WBC 24.2 (H) 2019    HGB 12.3 2019    HCT 35.9 2019     2019       Preop Vitals  BP Readings from Last 3 Encounters:   19 131/79   19 110/58   18 131/86    Pulse Readings from Last 3 Encounters:   19 88   18 78      Resp Readings from Last 3 Encounters:   19 20   19 16    SpO2 Readings from Last 3 Encounters:   19 100%      Temp Readings from Last 1 Encounters:   19 36.8  C (98.2  F) (Oral)    Ht Readings from Last 1 Encounters:   19 1.727 m (5' 7.99\")      Wt Readings from Last 1 Encounters:   19 74.8 kg (164 lb 14.5 oz)    Estimated body " "mass index is 25.08 kg/m  as calculated from the following:    Height as of an earlier encounter on 2/12/19: 1.727 m (5' 7.99\").    Weight as of an earlier encounter on 2/12/19: 74.8 kg (164 lb 14.5 oz).     LDA:  Peripheral IV 02/12/19 Right Lower forearm (Active)   Site Assessment WDL 2/12/2019  2:09 PM   Line Status Infusing 2/12/2019  2:09 PM   Phlebitis Scale 0-->no symptoms 2/12/2019  2:09 PM   Infiltration Scale 0 2/12/2019  2:09 PM   Number of days: 0            Assessment:   ASA SCORE: 2       Documentation: H&P complete; Preop Testing complete; Consents complete   Proceeding: Proceed without further delay  Tobacco Use:  Active user of Tobacco     Plan:   Anes. Type:  Anesthesia Standby   Pre-Induction: None   Induction:  Not applicable   Airway: Native Airway   Access/Monitoring: No Access Planned   Maintenance: Not Applicable   Emergence: Not Applicable   Logistics: Postop directly to Phase 2 (or similar)     PONV Management: NO PONV Prevention NeededAdult Risk Factors: Female                           Dennys Reyes MD  "

## 2019-02-13 ENCOUNTER — HOSPITAL ENCOUNTER (OUTPATIENT)
Dept: ULTRASOUND IMAGING | Facility: CLINIC | Age: 34
Discharge: HOME | End: 2019-02-13
Attending: OBSTETRICS & GYNECOLOGY | Admitting: OBSTETRICS & GYNECOLOGY
Payer: COMMERCIAL

## 2019-02-13 ENCOUNTER — DOCUMENTATION ONLY (OUTPATIENT)
Dept: MATERNAL FETAL MEDICINE | Facility: CLINIC | Age: 34
End: 2019-02-13

## 2019-02-13 PROCEDURE — 59074 FETAL FLUID DRAINAGE W/US: CPT | Performed by: OBSTETRICS & GYNECOLOGY

## 2019-02-13 PROCEDURE — 76815 OB US LIMITED FETUS(S): CPT

## 2019-02-13 NOTE — PROGRESS NOTES
Patient discharged ambulatory at 2030. Discharge instructions given, patient verbalizes understanding. Follow up at [facility] on 2/14/19 for stent placement evaluation.

## 2019-02-13 NOTE — PROGRESS NOTES
Patient had fetal thoracentesis with 76cc of pleural fluid removed. VSS post procedure. Active fetal movement, category tracing. No vaginal bleeding, contractions or LOF reported.

## 2019-02-13 NOTE — ANESTHESIA POSTPROCEDURE EVALUATION
Anesthesia POST Procedure Evaluation    Patient: Parul Herrera   MRN:     [id number] Gender:   female   Age:    33 year old :      1985        Preoperative Diagnosis: Fetal Pleural Effusion   Procedure(s):  FETAL INJECTION W/ OR W/O ULTRASOUND GUIDANCE AND MATERNAL FETAL MEDICINE SONOGRAPHER WILL COME   Postop Comments: No value filed.       Anesthesia Type:  Anesthesia Standby    Reportable Event: NO     PAIN: Uncomplicated   Sign Out status: Comfortable, Well controlled pain     PONV: No PONV   Sign Out status:  No Nausea or Vomiting     Neuro/Psych: Uneventful perioperative course   Sign Out Status: Preoperative baseline; Age appropriate mentation     Airway/Resp.: Uneventful perioperative course   Sign Out Status: Non labored breathing, age appropriate RR; Resp. Status within EXPECTED Parameters     CV: Uneventful perioperative course   Sign Out status: Appropriate BP and perfusion indices; Appropriate HR/Rhythm     Disposition:   Sign Out in:  Labor and Delivery  Disposition:  Labor and Delivery  Recovery Course: Uneventful  Follow-Up: Not required           Last Anesthesia Record Vitals:  CRNA VITALS  2019 1549 - 2019 1649      2019             Pulse:  101    SpO2:  98 %          Last PACU/Preop Vitals:  Vitals:    19 1413 19 1623 19 1700   BP: 131/79 137/79 131/85   Resp:  16 16   Temp:  36.6  C (97.9  F) 36.4  C (97.6  F)         Electronically Signed By: Ac Seay MD, 2019, 7:15 PM

## 2019-02-13 NOTE — PROGRESS NOTES
Mari from  will see Parul at her next [facility] appointment. PCC will schedule when next appointment is made and notify .   Ariadna Easley RN

## 2019-02-13 NOTE — DISCHARGE INSTRUCTIONS
Discharge Instruction for Undelivered Patients      You were seen for: fetal thoracentesis   We Consulted: Dr. Rosas  You had (Test or Medicine): fetal thoracentesis, fetal and uterine monitoring    Diet:   Drink 8 to 12 glasses of liquids (milk, juice, water) every day.  You may eat meals and snacks.     Activity:  Count fetal kicks everyday (see handout)     Call your provider if you notice:  Swelling in your face or increased swelling in your hands or legs.  Headaches that are not relieved by Tylenol (acetaminophen).  Changes in your vision (blurring: seeing spots or stars.)  Nausea (sick to your stomach) and vomiting (throwing up).   Weight gain of 5 pounds or more per week.  Heartburn that doesn't go away.  Signs of bladder infection: pain when you urinate (use the toilet), need to go more often and more urgently.  The bag of marino (rupture of membranes) breaks, or you notice leaking in your underwear.  Bright red blood in your underwear.  Abdominal (lower belly) or stomach pain.  For first baby: Contractions (tightening) less than 5 minutes apart for one hour or more.  Second (plus) baby: Contractions (tightening) less than 10 minutes apart and getting stronger.  *If less than 34 weeks: Contractions (tightenings) more than 6 times in one hour.  Increase or change in vaginal discharge (note the color and amount)  Other: if redness, swelling, foul smelling drainage or discomfort noted at site, notify physician.     Follow-up:  At [facility] for possible stent placement.

## 2019-02-15 ENCOUNTER — TELEPHONE (OUTPATIENT)
Dept: MATERNAL FETAL MEDICINE | Facility: CLINIC | Age: 34
End: 2019-02-15

## 2019-02-15 NOTE — TELEPHONE ENCOUNTER
Called Parul to discuss if she and her partner have made a decision on whether or not they want to proceed with the hydrops panel through [facility].    Contacted GCs at [facility] to let them know I attempted to contact patient today.    Erika Bey MS, [facility]  Maternal Fetal Medicine

## 2019-02-18 DIAGNOSIS — O35.CXX0 PLEURAL EFFUSION OF FETUS AFFECTING MANAGEMENT OF MOTHER, ANTEPARTUM: Primary | ICD-10-CM

## 2019-02-25 ENCOUNTER — OFFICE VISIT (OUTPATIENT)
Dept: MATERNAL FETAL MEDICINE | Facility: CLINIC | Age: 34
End: 2019-02-25
Attending: OBSTETRICS & GYNECOLOGY
Payer: COMMERCIAL

## 2019-02-25 ENCOUNTER — HOSPITAL ENCOUNTER (OUTPATIENT)
Dept: ULTRASOUND IMAGING | Facility: CLINIC | Age: 34
Discharge: HOME | End: 2019-02-25
Attending: OBSTETRICS & GYNECOLOGY | Admitting: OBSTETRICS & GYNECOLOGY
Payer: COMMERCIAL

## 2019-02-25 DIAGNOSIS — O35.9XX0 SUSPECTED FETAL ANOMALY, ANTEPARTUM, SINGLE OR UNSPECIFIED FETUS: ICD-10-CM

## 2019-02-25 DIAGNOSIS — O35.CXX0 PLEURAL EFFUSION OF FETUS AFFECTING MANAGEMENT OF MOTHER, ANTEPARTUM: Primary | ICD-10-CM

## 2019-02-25 DIAGNOSIS — O35.CXX0 PLEURAL EFFUSION, FETAL, AFFECTING CARE OF MOTHER, ANTEPARTUM: Primary | ICD-10-CM

## 2019-02-25 DIAGNOSIS — O35.CXX0 PLEURAL EFFUSION, FETAL, AFFECTING CARE OF MOTHER, ANTEPARTUM: ICD-10-CM

## 2019-02-25 PROCEDURE — 76819 FETAL BIOPHYS PROFIL W/O NST: CPT | Performed by: OBSTETRICS & GYNECOLOGY

## 2019-02-25 PROCEDURE — 76816 OB US FOLLOW-UP PER FETUS: CPT

## 2019-02-25 NOTE — PROGRESS NOTES
Please see full imaging report from [facility] program under imaging tab.      Cheyanne Echevarria MD  Maternal Fetal Medicine

## 2019-02-26 DIAGNOSIS — O35.CXX0 PLEURAL EFFUSION, FETAL, AFFECTING CARE OF MOTHER, ANTEPARTUM: Primary | ICD-10-CM

## 2019-02-28 ENCOUNTER — HOSPITAL ENCOUNTER (OUTPATIENT)
Dept: ULTRASOUND IMAGING | Facility: CLINIC | Age: 34
Discharge: HOME | End: 2019-02-28
Attending: OBSTETRICS & GYNECOLOGY | Admitting: OBSTETRICS & GYNECOLOGY
Payer: COMMERCIAL

## 2019-02-28 ENCOUNTER — OFFICE VISIT (OUTPATIENT)
Dept: MATERNAL FETAL MEDICINE | Facility: CLINIC | Age: 34
End: 2019-02-28
Attending: OBSTETRICS & GYNECOLOGY
Payer: COMMERCIAL

## 2019-02-28 DIAGNOSIS — O35.CXX0 PLEURAL EFFUSION, FETAL, AFFECTING CARE OF MOTHER, ANTEPARTUM: ICD-10-CM

## 2019-02-28 DIAGNOSIS — O35.CXX0 PLEURAL EFFUSION, FETAL, AFFECTING CARE OF MOTHER, ANTEPARTUM: Primary | ICD-10-CM

## 2019-02-28 PROCEDURE — 76821 MIDDLE CEREBRAL ARTERY ECHO: CPT

## 2019-02-28 PROCEDURE — 76816 OB US FOLLOW-UP PER FETUS: CPT

## 2019-02-28 PROCEDURE — 76819 FETAL BIOPHYS PROFIL W/O NST: CPT | Performed by: OBSTETRICS & GYNECOLOGY

## 2019-03-04 ENCOUNTER — OFFICE VISIT (OUTPATIENT)
Dept: MATERNAL FETAL MEDICINE | Facility: CLINIC | Age: 34
End: 2019-03-04
Attending: OBSTETRICS & GYNECOLOGY
Payer: COMMERCIAL

## 2019-03-04 ENCOUNTER — HOSPITAL ENCOUNTER (OUTPATIENT)
Dept: ULTRASOUND IMAGING | Facility: CLINIC | Age: 34
Discharge: HOME | End: 2019-03-04
Attending: OBSTETRICS & GYNECOLOGY | Admitting: OBSTETRICS & GYNECOLOGY
Payer: COMMERCIAL

## 2019-03-04 ENCOUNTER — TELEPHONE (OUTPATIENT)
Dept: MATERNAL FETAL MEDICINE | Facility: CLINIC | Age: 34
End: 2019-03-04

## 2019-03-04 DIAGNOSIS — O35.CXX0 PLEURAL EFFUSION OF FETUS AFFECTING MANAGEMENT OF MOTHER, ANTEPARTUM: ICD-10-CM

## 2019-03-04 DIAGNOSIS — O35.CXX0 PLEURAL EFFUSION, FETAL, AFFECTING CARE OF MOTHER, ANTEPARTUM: Primary | ICD-10-CM

## 2019-03-04 PROCEDURE — 76819 FETAL BIOPHYS PROFIL W/O NST: CPT | Performed by: OBSTETRICS & GYNECOLOGY

## 2019-03-04 PROCEDURE — 76816 OB US FOLLOW-UP PER FETUS: CPT

## 2019-03-04 NOTE — TELEPHONE ENCOUNTER
3/4/2019    Called Parul at the request of [facility] physician Dr. Cat to discuss further options for genetic testing for Parul's ongoing pregnancy.  Left a voicemail with direct contact information for myself and our clinic.    Koko Malagon MS, [facility]  Licensed Genetic Counselor  Phone: [phone]  Pager: [phone]

## 2019-03-04 NOTE — PROGRESS NOTES
Please see ultrasound report under imaging tab for details on ultrasound performed today.    Yoselyn Cat MD  , OB/GYN  Maternal-Fetal Medicine  [email or web address]  [phone] (Academic office)  [phone] (Pager)

## 2019-03-06 ENCOUNTER — TELEPHONE (OUTPATIENT)
Dept: CARE COORDINATION | Facility: CLINIC | Age: 34
End: 2019-03-06

## 2019-03-06 DIAGNOSIS — O35.9XX0 SUSPECTED FETAL ANOMALY, ANTEPARTUM, SINGLE OR UNSPECIFIED FETUS: Primary | ICD-10-CM

## 2019-03-06 NOTE — TELEPHONE ENCOUNTER
[facility]  MATERNAL CHILD HEALTH SOCIAL WORK PROGRESS NOTE    SW contacted pt via telephone this morning to check-in. Pt told SW that she is currently at [facility] getting a left sided shunt placed. Pt family expect to discharge in the coming day. Pt requested SW come to meet with family at upcoming [facility] clinic appointment on Wednesday March 13th at 1:30PM for supportive visit. SW will meet with pt family at upcoming appointment. SW will continue to collaborate with the multidisciplinary team.     GURDEEP Schwab, [facility]  Clinical   Maternal Child Health  [facility]  Phone:   [phone]  Pager:    [phone]

## 2019-03-07 ENCOUNTER — TELEPHONE (OUTPATIENT)
Dept: MATERNAL FETAL MEDICINE | Facility: CLINIC | Age: 34
End: 2019-03-07

## 2019-03-07 NOTE — TELEPHONE ENCOUNTER
Called pt to schedule Fet Echo with [facility]. Pt declined Echo with M saying she had one done at [place] on 3/6. Pt said Dr Alcazar is aware of this. Removing order. Referring provider notified.

## 2019-03-07 NOTE — TELEPHONE ENCOUNTER
Patient states she was seen by [facility] surgeon yesterday and had fetal echo so would like to cancel echo appointment.  OK per Dr. Alcazar.   staff notified to cancel echo.

## 2019-03-11 ENCOUNTER — HOSPITAL ENCOUNTER (OUTPATIENT)
Dept: LAB | Facility: CLINIC | Age: 34
Discharge: HOME | End: 2019-03-11
Attending: OBSTETRICS & GYNECOLOGY | Admitting: OBSTETRICS & GYNECOLOGY
Payer: COMMERCIAL

## 2019-03-11 DIAGNOSIS — O35.CXX0 PLEURAL EFFUSION OF FETUS AFFECTING MANAGEMENT OF MOTHER, ANTEPARTUM: ICD-10-CM

## 2019-03-11 LAB — GLUCOSE 1H P 50 G GLC PO SERPL-MCNC: 127 MG/DL (ref 60–129)

## 2019-03-11 PROCEDURE — 36415 COLL VENOUS BLD VENIPUNCTURE: CPT | Performed by: OBSTETRICS & GYNECOLOGY

## 2019-03-11 PROCEDURE — 82950 GLUCOSE TEST: CPT | Performed by: OBSTETRICS & GYNECOLOGY

## 2019-03-13 ENCOUNTER — HOSPITAL ENCOUNTER (OUTPATIENT)
Dept: ULTRASOUND IMAGING | Facility: CLINIC | Age: 34
Discharge: HOME | End: 2019-03-13
Attending: OBSTETRICS & GYNECOLOGY | Admitting: OBSTETRICS & GYNECOLOGY
Payer: COMMERCIAL

## 2019-03-13 ENCOUNTER — PRENATAL OFFICE VISIT (OUTPATIENT)
Dept: CARE COORDINATION | Facility: CLINIC | Age: 34
End: 2019-03-13

## 2019-03-13 ENCOUNTER — ALLIED HEALTH/NURSE VISIT (OUTPATIENT)
Dept: MATERNAL FETAL MEDICINE | Facility: CLINIC | Age: 34
End: 2019-03-13
Attending: OBSTETRICS & GYNECOLOGY
Payer: COMMERCIAL

## 2019-03-13 VITALS
BODY MASS INDEX: 26.34 KG/M2 | HEART RATE: 103 BPM | SYSTOLIC BLOOD PRESSURE: 122 MMHG | DIASTOLIC BLOOD PRESSURE: 80 MMHG | RESPIRATION RATE: 18 BRPM | WEIGHT: 173.2 LBS

## 2019-03-13 DIAGNOSIS — O35.9XX0 SUSPECTED FETAL ANOMALY, ANTEPARTUM, SINGLE OR UNSPECIFIED FETUS: ICD-10-CM

## 2019-03-13 DIAGNOSIS — O35.CXX0 PLEURAL EFFUSION, FETAL, AFFECTING CARE OF MOTHER, ANTEPARTUM: ICD-10-CM

## 2019-03-13 DIAGNOSIS — Z71.9 ENCOUNTER FOR COUNSELING: Primary | ICD-10-CM

## 2019-03-13 DIAGNOSIS — O35.CXX0 PLEURAL EFFUSION OF FETUS AFFECTING MANAGEMENT OF MOTHER, ANTEPARTUM: ICD-10-CM

## 2019-03-13 DIAGNOSIS — O09.93 SUPERVISION OF HIGH RISK PREGNANCY IN THIRD TRIMESTER: Primary | ICD-10-CM

## 2019-03-13 PROCEDURE — G0463 HOSPITAL OUTPT CLINIC VISIT: HCPCS | Mod: 25,ZF

## 2019-03-13 PROCEDURE — 76821 MIDDLE CEREBRAL ARTERY ECHO: CPT | Performed by: OBSTETRICS & GYNECOLOGY

## 2019-03-13 PROCEDURE — 90715 TDAP VACCINE 7 YRS/> IM: CPT | Mod: ZF

## 2019-03-13 PROCEDURE — 76816 OB US FOLLOW-UP PER FETUS: CPT

## 2019-03-13 PROCEDURE — 25000128 H RX IP 250 OP 636: Mod: ZF

## 2019-03-13 PROCEDURE — 90471 IMMUNIZATION ADMIN: CPT | Mod: ZF

## 2019-03-13 NOTE — NURSING NOTE
Parul presents to [facility] accompanied by her mother. Pt seen in clinic today for follow up ultrasound at [facility] visit at 30w6d gestation due to pregnancy c/b fetal- bilateral pleural effusions s/p thoraco-amniotic shunts placed at [place] (see report/notes). VSS. Pt reports + fetal movement. Reviewed fetal kick counts. Pt denies bldg/lof/change in discharge/contractions/headache/vision changes/chest pain/SOB/edema. Reviewed new patient folder, PCC contact information and card, follow up appts scheduled. Tdap given. Prior to injection, verified patient identity using patient's name and date of birth. Due to injection administration, patient instructed to remain in clinic for 15 minutes  afterwards, and to report any adverse reaction to me immediately.     Drug Amount Wasted:  None.   Vial/Syringe: Multi dose vial  Expiration Date:  6/1/21      Dr. Sethi met with pt and discussed POC. Plan to continue 2x/week hydrops checks, BPP, UAR, MCA. Pt discharged stable and ambulatory.       Yoselyn Juarez RN

## 2019-03-13 NOTE — PROGRESS NOTES
[facility]  MATERNAL CHILD HEALTH SOCIAL WORK PROGRESS NOTE    JOAN met with pt and her mother in [facility] clinic this afternoon to provide some community resources. SW provided pt with the below resources:    Online resources:    Sidelines - https://www.sidelines.org/ - is a group that provides support to women experiencing high-risk pregnancy.    High Risk Hope - https://www.highriskhope.org/    Books for siblings:    Tayla, Always by Ewa Ghosh and Stephen Chin     Wherever You Are: My love will find you by Belia Marcos    What Happens When a Loved One Dies? By Dr. Neena Newell and the [place] Full of Memories by Marie Key    The Way I Feel by Sonny Tsang (is a good book about feelings)    My Many Colored Days    The Invisible String     SW will continue to assess needs and provide ongoing psychosocial support and access to appropriate resources/referrals. Pt family have this SW contact information, are aware of how to contact this SW. JOAN will continue to collaborate with the multidisciplinary team.    GURDEEP Schwab, [facility] Center[facility]  Clinical   Maternal Child Health  [facility]  Phone:   [phone]  Pager:    [phone]

## 2019-03-14 DIAGNOSIS — O09.90 HIGH-RISK PREGNANCY, UNSPECIFIED TRIMESTER: Primary | ICD-10-CM

## 2019-03-14 NOTE — PROGRESS NOTES
"[facility] OB visit    S: Parul is overall feeling well today.  Baby is active.  She denies any contractions, LOF or VB.  Underwent 2nd thoracoamniotic shunt on 3/5 with Dr. Allison.    O: /80 (BP Location: Right arm, Patient Position: Chair)   Pulse 103   Resp 18   Wt 78.6 kg (173 lb 3.2 oz)   LMP 2018   BMI 26.34 kg/m      Gen: Pt AAOx3, NAD  Abd; Soft, NT, no palp ctx  Ext: No edema    Please see \"Imaging\" tab under \"Chart Review\" for details of today's visit.     OB hx:  3/2017: Term , 7lbs 15oz no complications. Baby had no red reflex, saw outpatient ophtho and had full eval at [place]. Dx with Urias syndrome, de petr mutation. Had  corneal surgery and vision is restored, wears glasses.    A/P: Parul Herrera is a  at 29w6d with pregnancy with bilateral fetal hydrothorax.    Bilateral fetal hydrothorax  S/p thoracoamniotic shunt on right on 2/15 and on 3/5 on left  S/p BMZ x 2 courses on ,  and 3/5, 3/6  Normal amniocentesis  Twice weekly assessment to rule out hydrops.  Begin BPP at 32 weeks.    PNC  MILLICENT visit in 2 weeks   on 3/11/19    Josef Sethi I spent a total of 10 minutes face-to-face with Parul Herrera during today's office visit.  Over 50% of this time was spent counseling the patient and/or coordinating care regarding pregnancy complicated by bilateral fetal hydrothorax.  See note for details.    Josef Sethi            "

## 2019-03-18 ENCOUNTER — OFFICE VISIT (OUTPATIENT)
Dept: MATERNAL FETAL MEDICINE | Facility: CLINIC | Age: 34
End: 2019-03-18
Attending: OBSTETRICS & GYNECOLOGY
Payer: COMMERCIAL

## 2019-03-18 ENCOUNTER — PRENATAL OFFICE VISIT (OUTPATIENT)
Dept: CARE COORDINATION | Facility: CLINIC | Age: 34
End: 2019-03-18

## 2019-03-18 ENCOUNTER — HOSPITAL ENCOUNTER (OUTPATIENT)
Dept: ULTRASOUND IMAGING | Facility: CLINIC | Age: 34
Discharge: HOME | End: 2019-03-18
Attending: OBSTETRICS & GYNECOLOGY | Admitting: OBSTETRICS & GYNECOLOGY
Payer: COMMERCIAL

## 2019-03-18 DIAGNOSIS — O35.CXX0 PLEURAL EFFUSION, FETAL, AFFECTING CARE OF MOTHER, ANTEPARTUM: Primary | ICD-10-CM

## 2019-03-18 DIAGNOSIS — Z71.9 ENCOUNTER FOR COUNSELING: Primary | ICD-10-CM

## 2019-03-18 DIAGNOSIS — O09.90 HIGH-RISK PREGNANCY, UNSPECIFIED TRIMESTER: ICD-10-CM

## 2019-03-18 DIAGNOSIS — O35.CXX0 PLEURAL EFFUSION OF FETUS AFFECTING MANAGEMENT OF MOTHER, ANTEPARTUM: ICD-10-CM

## 2019-03-18 PROCEDURE — 76821 MIDDLE CEREBRAL ARTERY ECHO: CPT | Performed by: OBSTETRICS & GYNECOLOGY

## 2019-03-18 PROCEDURE — 76816 OB US FOLLOW-UP PER FETUS: CPT

## 2019-03-18 NOTE — PROGRESS NOTES
[facility]  MATERNAL CHILD HEALTH   SOCIAL WORK PROGRESS NOTE    DATA:     SW participated in a NICU consult with Dr. Solorio. Pt is Parul Herrera ( 1985). SW familiar with pt family from previous prenatal visit. She is currently 31w4d pregnant with a baby boy, name undecided. FOB/spouse is Alpesh. Couple have a 1 y/o daughter, Bing. During NICU consult pt, FOB, and pt's father-in-law were present.    Fetal diagnoses:    Bilateral hydrothorax (massive right pleural effusion)    S/P right fetal thoraco-amniotic shunt placement  @ 27w1d (2/15 -- [place])    S/P left fetal thoraco-amniotic shunt placement @ 29w5d (3/5-- [place])    S/P betamethasone  & , rescue course 3/5 &3/6    INTERVENTION:       SW participated in a NICU consult in [facility] clinic today, 2019.     Reviewed orientation to the NICU, including: parking passes, boarding rooms, rounding, treatment teams, primary nursing, and our welcoming policy of visitation.     Reassured family of ongoing SW supportive services available to them at the hospital. Encouraged parents to access this SW for support as needed.     ASSESSMENT:     Family appeared open to and appreciative of ongoing therapeutic support, advocacy, and connection with resources. They were engaged and asked appropriate questions during their NICU consult.     PLAN:     SW will continue to follow throughout pt's Maternal-Child Health Journey to offer psychosocial support and access to appropriate resources. SW will continue to collaborate with the multidisciplinary team.    GURDEEP Schwab, [facility]  Clinical   Maternal Child Health  [facility]  Phone:   [phone]  Pager:    [phone]

## 2019-03-18 NOTE — PROGRESS NOTES
"Please see \"Imaging\" tab under \"Chart Review\" for details of today's US at the [facility].    Zion Sullivan MD  Maternal-Fetal Medicine      "

## 2019-03-18 NOTE — NURSING NOTE
Parul presents to [facility] with her  and father in law. US and NICU/SW consult and tour completed today with Dr. Solorio and JOAN Schwab.       Yoselyn Juarez RN

## 2019-03-21 ENCOUNTER — HOSPITAL ENCOUNTER (OUTPATIENT)
Dept: ULTRASOUND IMAGING | Facility: CLINIC | Age: 34
Discharge: HOME | End: 2019-03-21
Attending: OBSTETRICS & GYNECOLOGY | Admitting: OBSTETRICS & GYNECOLOGY
Payer: COMMERCIAL

## 2019-03-21 ENCOUNTER — OFFICE VISIT (OUTPATIENT)
Dept: MATERNAL FETAL MEDICINE | Facility: CLINIC | Age: 34
End: 2019-03-21
Attending: OBSTETRICS & GYNECOLOGY
Payer: COMMERCIAL

## 2019-03-21 DIAGNOSIS — O35.CXX0 PLEURAL EFFUSION, FETAL, AFFECTING CARE OF MOTHER, ANTEPARTUM: ICD-10-CM

## 2019-03-21 DIAGNOSIS — O35.9XX0 SUSPECTED FETAL ABNORMALITY AFFECTING MANAGEMENT OF MOTHER, SINGLE OR UNSPECIFIED FETUS: Primary | ICD-10-CM

## 2019-03-21 PROCEDURE — 76816 OB US FOLLOW-UP PER FETUS: CPT

## 2019-03-21 PROCEDURE — 76819 FETAL BIOPHYS PROFIL W/O NST: CPT | Performed by: OBSTETRICS & GYNECOLOGY

## 2019-03-22 ENCOUNTER — HOSPITAL ENCOUNTER (OUTPATIENT)
Dept: ULTRASOUND IMAGING | Facility: CLINIC | Age: 34
Discharge: HOME | End: 2019-03-22
Attending: OBSTETRICS & GYNECOLOGY | Admitting: OBSTETRICS & GYNECOLOGY
Payer: COMMERCIAL

## 2019-03-22 ENCOUNTER — OFFICE VISIT (OUTPATIENT)
Dept: MATERNAL FETAL MEDICINE | Facility: CLINIC | Age: 34
End: 2019-03-22
Attending: OBSTETRICS & GYNECOLOGY
Payer: COMMERCIAL

## 2019-03-22 DIAGNOSIS — O35.CXX0 PLEURAL EFFUSION, FETAL, AFFECTING CARE OF MOTHER, ANTEPARTUM: Primary | ICD-10-CM

## 2019-03-22 DIAGNOSIS — O35.9XX0 SUSPECTED FETAL ABNORMALITY AFFECTING MANAGEMENT OF MOTHER, SINGLE OR UNSPECIFIED FETUS: ICD-10-CM

## 2019-03-22 PROCEDURE — 76815 OB US LIMITED FETUS(S): CPT

## 2019-03-22 NOTE — PROGRESS NOTES
Visit Date:   2019     Dear Colleagues:    I had the pleasure of meeting your patient, Parul Herrera, in the Maternal Fetal Medicine Clinic at the [facility] on 2019.  She was accompanied to the visit by her , Alpesh, and her father-in-law, Omar.  This visit was at the request of Dr. Josef Sethi of Maternal Fetal Medicine Service.  Also present at the visit were Patient Care Coordinator, Yoselyn Juarez, and  Social Worker, Mary Kimbrough.  Parul was seen in consultation at 31 weeks 4 days gestational age with an estimated delivery date of 2019, given diagnosis of fetal bilateral hydrothorax.  Initially, she was diagnosed with a massive right pleural effusion that had evolved to hydrops and is now status post right fetal thoracoamniotic shunt placement around 27 weeks on 02/15 which helped with resolution of hydrops along with status post left fetal thoracoamniotic shunt placement around 29 weeks on .  She has received betamethasone 2 courses; the first,  and  and the second on  and .  Further workup has included an amniocentesis with normal XY chromosomes and a normal microarray.  Viral serologies are negative.  Analysis of fluid from the thoracentesis initially was not diagnostic but on  did show a lymphocyte predominance and the working diagnosis is a possible chylothorax.  The pregnancy has otherwise not been complicated.      I reviewed the diagnosis of congenital bilateral hydrothorax and focused on the possibility of chylothorax as the diagnosis.  I reviewed that there will be a  resuscitation team present at the delivery, at which time their baby boy will be evaluated from a respiratory and a cardiac standpoint immediately.  Should his thoracoamniotic shunts remain in place at the time of delivery, these are to be removed per instructions from placement at [place], which will be communicated with the Obstetrics  team.  Our  resuscitation team will evaluate whether or not respiratory support is indicated and is likely to include at least a CPAP continuous positive airway pressure and often includes intubation and mechanical ventilation and may include need for a needle thoracentesis in the delivery room along with chest tube placement in the delivery room or after admission to the [place] [facility]ensive [facility] Unit at the [facility].  We discussed overall plans for care which depending on the amount of support needed include likely placement of central venous catheter in the umbilical vein and potentially umbilical arterial catheter, all for medication administration.  Blood pressure monitoring and labs sampling.  I described various modes of ventilation and overall progression of hydrothorax.  We did talk a little bit about diagnostic workup again mainly focusing on the current presumptive diagnosis of chylothorax.  We talked about potentially prolonged respiratory course, but that wide range of time period as possible.  We talked about potentially prolonged course of no enteral feedings as the chylothorax is expected to resolve over time.  We did talk about this sometimes needing medications or surgical intervention, but hope for spontaneous resolution.  We talked about various nutritional management, both including initial intravenous nutrition, then eventual transition to enteral nutrition.  Parul plans to breastfeed.  We talked about initially pumping and storing her milk until it is safe for her baby boy to receive this anteriorly.  We did also mention that in some cases of chylothorax, breast milk is not tolerated without reaccumulation of the chylothorax.      We briefly talked about sometimes needing surgical intervention for removal of thoracoamniotic shunts but did not talk extensively about this.      I discussed overall makeup of the medical team and healthcare providers  on the [facility].  I described the typical structure of rounds and invited them to be present during rounds.  I assured them that if they are not able to be present or do not wish to receive the information in this way, there is always someone who updates some after round and there is always a provider available to answer questions at any time.  We did talk about occasionally having consulting physicians as part of the team based on their baby's particular needs and diagnoses.  I discussed other aspects of the  intensive care unit, including our welcoming policies with the requirement for influenza vaccine for all people entering the NICU during viral season.  We also discussed visitor age restrictions currently in place given that they have a 2-year-old daughter, Bing, and very much wish for her to be able to visit whenever possible.  At time of this visit, an exact plans for lifting of restrictions from a viral season is unknown.  I provided a tour of the  intensive care unit to them following our consultation along with my contact information should questions arise following this clinic visit.  They have also received contact information for our  Social Worker, Mary.      Thank you very much for the opportunity to meet this delightful couple and Licha's father-in-law, Omar.  We look forward to caring for their baby boy in the  intensive care unit at the [facility] and working with their family after his birth.  Please do not hesitate to contact me with questions regarding this consultation.      Total time spent was 30 minutes with 100% of the time in direct patient counseling.         Sincerely,      DERIC PARDO MD             D: 2019   T: 2019   MT: SAMPSON      Name:     LICHA PLATT   MRN:      [id number]        Account:      [id number]   :      1985           Visit Date:   2019       Document: [id number]       cc: Nanci Sethi MD

## 2019-03-25 ENCOUNTER — OFFICE VISIT (OUTPATIENT)
Dept: MATERNAL FETAL MEDICINE | Facility: CLINIC | Age: 34
End: 2019-03-25
Attending: OBSTETRICS & GYNECOLOGY
Payer: COMMERCIAL

## 2019-03-25 ENCOUNTER — ANESTHESIA EVENT (OUTPATIENT)
Dept: OBGYN | Facility: CLINIC | Age: 34
End: 2019-03-25
Payer: COMMERCIAL

## 2019-03-25 ENCOUNTER — HOSPITAL ENCOUNTER (OUTPATIENT)
Dept: ULTRASOUND IMAGING | Facility: CLINIC | Age: 34
Discharge: HOME | End: 2019-03-25
Attending: OBSTETRICS & GYNECOLOGY | Admitting: OBSTETRICS & GYNECOLOGY
Payer: COMMERCIAL

## 2019-03-25 VITALS
WEIGHT: 175 LBS | OXYGEN SATURATION: 99 % | SYSTOLIC BLOOD PRESSURE: 128 MMHG | HEART RATE: 98 BPM | DIASTOLIC BLOOD PRESSURE: 74 MMHG | BODY MASS INDEX: 26.61 KG/M2 | RESPIRATION RATE: 20 BRPM

## 2019-03-25 DIAGNOSIS — O35.CXX0 PLEURAL EFFUSION, FETAL, AFFECTING CARE OF MOTHER, ANTEPARTUM: ICD-10-CM

## 2019-03-25 PROCEDURE — 76819 FETAL BIOPHYS PROFIL W/O NST: CPT | Performed by: OBSTETRICS & GYNECOLOGY

## 2019-03-25 PROCEDURE — G0463 HOSPITAL OUTPT CLINIC VISIT: HCPCS | Mod: 25,ZF

## 2019-03-25 PROCEDURE — 76816 OB US FOLLOW-UP PER FETUS: CPT

## 2019-03-25 ASSESSMENT — PAIN SCALES - GENERAL: PAINLEVEL: NO PAIN (0)

## 2019-03-25 NOTE — PROGRESS NOTES
"[facility] OB visit    S: Parul is overall feeling well today.  Baby is active.  She states she has been having irregular contractions, some are stronger but still intermittent.  LOF or VB.      O: /74 (BP Location: Left arm, Patient Position: Sitting, Cuff Size: Adult Regular)   Pulse 98   Resp 20   Wt 79.4 kg (175 lb)   LMP 2018   SpO2 99%   BMI 26.61 kg/m      Gen: Pt AAOx3, NAD  Abd; Soft, NT, no palp ctx  Ext: No edema    Please see \"Imaging\" tab under \"Chart Review\" for details of today's visit.     OB hx:  3/2017: Term , 7lbs 15oz no complications. Baby had no red reflex, saw outpatient ophtho and had full eval at [place]. Dx with Urias syndrome, de petr mutation. Had  corneal surgery and vision is restored, wears glasses.    A/P: Parul Herrera is a  at 32w4d with pregnancy with bilateral fetal hydrothorax.    Bilateral fetal hydrothorax  S/p thoracoamniotic shunt on right on 2/15 and on 3/5 on left  S/p BMZ x 2 courses on ,  and 3/5, 3/6  Normal amniocentesis  Increasing hydrothorax on left despite normal position of shunt - likely decreased functioning of shunt due to debris clogging shunt.  Discussed with Dr. Allison, who recommend repeat thoracentesis, which I agree with as repeat shunt is not recommended as there is currently a shunt in place on the left side.  Will plan thoracentesis for tomorrow.      PNC  MILLICENT visit in 2 weeks   on 3/11/19    Josef Sethi    I spent a total of 10 minutes face-to-face with Parul Herrera during today's office visit.  Over 50% of this time was spent counseling the patient and/or coordinating care regarding pregnancy complicated by bilateral fetal hydrothorax.  See note for details.    Josef Sethi              "

## 2019-03-25 NOTE — H&P
"[facility] Antepartum History and Physical    Parul Herrera MRN# [id number]   Age: 33 year old YOB: 1985     Date of Admission: 19           Chief Complaint:     Parul Herrera is a 32 yo  at 32w5d by 8w0d US who is here for a therapeutic fetal thoracentesis due to a reaccumulation of a left sided chylothorax. She previously had a right thoracentesis on  and again on , and then bilateral shunts placed with Dr. Allison at [place] on 2/15 (right) and 3/5 (left) which is no longer functioning. She has had a full workup in this pregnancy (neg CMV, Parvo, HSV, Toxo and RPR) and had an amniocentesis with normal XY chromosomes and a normal microarray.     She is s/p BMZ on - and 3/5-3/6/19.     Today she is doing well, however she is complaining of some mild irregular contractions, but she denies leakage of fluid, vaginal bleeding and reporting good fetal movements.           Pregnancy history:     OBSTETRIC HISTORY:    Term      EDC: Estimated Date of Delivery: May 16, 2019    Prenatal Labs:   Lab Results   Component Value Date    ABO A 2019    RH Pos 2019    AS Neg 2019    HGB 12.3 2019       GBS Status: pending    Active Problem List:  Congenital bilateral hydrothorax    Medication Prior to Admission  PNV.        Maternal Past Medical History:     No pertinent past medical history                     Social History:   Neg etoh/tob/drug use during this pregnancy         Review of Systems:   The Review of Systems is negative other than noted in the HPI          Physical Exam:   Vital signs:  Temp: 98  F (36.7  C) Temp src: Oral BP: 131/76   Heart Rate: 109 Resp: 20       Height: 172.7 cm (5' 8\") Weight: 79.4 kg (175 lb)  Estimated body mass index is 26.61 kg/m  as calculated from the following:    Height as of this encounter: 1.727 m (5' 8\").    Weight as of this encounter: 79.4 kg (175 lb).    Constitutional:   awake, alert, cooperative, no " "apparent distress, and appears stated age       Abdomen:   Well healed scars from shunt placement, soft, gravid, non-distended, non-tender      Cervix: closed/long/high    Ultrasound Exam: initially fetus was right side up.  Waited approximately 3 hours with multiple evaluations until fetus turned left side up.    FHT: 145 w/ moderate variability + accelerations, approximately 3-4 decelerations, one late in timing  TOCO: irregular                     Assessment:     32 yo  at 32w5d by 8w0d US here for a therapeutic fetal thoracentesis due to a reaccumulation of a left sided pleural effusion.  Due to poor fetal position the patient was offered an evaluation at [facility] with Dr. Allison versus waiting until the fetus changes position.  She opted to wait and the fetus did roll left side up.  She understands that this is a temporizing measure and that the fluid will reaccumulate and she will likely require another procedure.  Also understands the risk of PPROM and abruption and possible need for emergent .  Understands that the decelerations we are seeing are likely secondary to the pleural effusion.          Plan:     - Thoracentesis due to reaccumulation of left fetal chylothorax  - GBS sent  - Patient is Rh positive   - Vecuronium ordered for fetal paralysis prior to procedure  - Reviewed risks, benefits and alternatives to procedure, informed consent signed. Patient aware of possible need for  delivery  - Planned observation post procedure with continuous fetal monitoring and toco, will monitor for cervical change     The patient was discussed with Dr. Emory Walsh MD     Maternal-Fetal Medicine Attending Addendum    I have discussed the care of Ms. Herrera with the fellow.    /76   Temp 98  F (36.7  C) (Oral)   Resp 20   Ht 1.727 m (5' 8\")   Wt 79.4 kg (175 lb)   LMP 2018   BMI 26.61 kg/m      A/P: 33 year old  32w5d presenting for thoracocentesis. Patient " agrees with plan of care and all questions answered.     I spent a total of 60 minutes face-to-face or coordinating care of Parul Herrera.  More than 50% of my time on the unit was spent counseling and/or coordinating care regarding fetal pleural effusion.  See note for details; I have made the necessary edits/additions.      Date of service (when I saw the patient): March 26, 2019      Yoselyn Cat MD  , OB/GYN  Maternal-Fetal Medicine  [email or web address]  [phone] (Academic office)  [phone] (Pager)

## 2019-03-25 NOTE — NURSING NOTE
Parul, here with her  for a RL2 and OBV at 32w4d gestation. Her pregnancy is c/b fetal- bilateral pleural effusions s/p thoraco-amniotic shunts placed at [facility] (see report/notes). VSS. Pt denies  LOF/Bleeding/change in discharge/HA/vision changes/SOB/chest pains/edema. Parul reports have mild intermittent occasional contractions. Dr. Sethi met with pt to discuss POC. Plan for repeat thoracentesis tomorrow at 10:30 at [facility] with Dr. Cat and f/u on 3/28 for a RL2. Parul agrees with plan of care, PCC called L&D and surgery scheduling. Pt discharged ambulatory and Stable.   Ariadna Easley RN

## 2019-03-26 ENCOUNTER — HOSPITAL ENCOUNTER (OUTPATIENT)
Dept: ULTRASOUND IMAGING | Facility: CLINIC | Age: 34
End: 2019-03-26
Attending: OBSTETRICS & GYNECOLOGY
Payer: COMMERCIAL

## 2019-03-26 ENCOUNTER — ANESTHESIA (OUTPATIENT)
Dept: OBGYN | Facility: CLINIC | Age: 34
End: 2019-03-26
Payer: COMMERCIAL

## 2019-03-26 ENCOUNTER — HOSPITAL ENCOUNTER (OUTPATIENT)
Facility: CLINIC | Age: 34
Setting detail: OBSERVATION
LOS: 1 days | Discharge: HOME | End: 2019-03-27
Attending: OBSTETRICS & GYNECOLOGY | Admitting: OBSTETRICS & GYNECOLOGY
Payer: COMMERCIAL

## 2019-03-26 PROBLEM — O35.9XX0 FETAL ABNORMALITY IN ANTEPARTUM PREGNANCY: Status: ACTIVE | Noted: 2019-03-26

## 2019-03-26 PROBLEM — O35.CXX0: Status: ACTIVE | Noted: 2019-03-26

## 2019-03-26 LAB
ABO + RH BLD: NORMAL
ABO + RH BLD: NORMAL
ALBUMIN UR-MCNC: NEGATIVE MG/DL
APPEARANCE FLD: CLEAR
APPEARANCE UR: CLEAR
BACTERIA #/AREA URNS HPF: ABNORMAL /HPF
BILIRUB UR QL STRIP: NEGATIVE
BLD GP AB SCN SERPL QL: NORMAL
BLOOD BANK CMNT PATIENT-IMP: NORMAL
COLOR FLD: YELLOW
COLOR UR AUTO: ABNORMAL
EOSINOPHIL NFR FLD MANUAL: 4 %
ERYTHROCYTE [DISTWIDTH] IN BLOOD BY AUTOMATED COUNT: 12.9 % (ref 10–15)
GLUCOSE UR STRIP-MCNC: NEGATIVE MG/DL
HCT VFR BLD AUTO: 38.9 % (ref 35–47)
HGB BLD-MCNC: 13 G/DL (ref 11.7–15.7)
HGB UR QL STRIP: NEGATIVE
KETONES UR STRIP-MCNC: 5 MG/DL
LEUKOCYTE ESTERASE UR QL STRIP: ABNORMAL
LYMPHOCYTES NFR FLD MANUAL: 73 %
MCH RBC QN AUTO: 29.1 PG (ref 26.5–33)
MCHC RBC AUTO-ENTMCNC: 33.4 G/DL (ref 31.5–36.5)
MCV RBC AUTO: 87 FL (ref 78–100)
MONOS+MACROS NFR FLD MANUAL: 22 %
MUCOUS THREADS #/AREA URNS LPF: PRESENT /LPF
NEUTS BAND NFR FLD MANUAL: 1 %
NITRATE UR QL: NEGATIVE
PH UR STRIP: 7 PH (ref 5–7)
PLATELET # BLD AUTO: 300 10E9/L (ref 150–450)
RBC # BLD AUTO: 4.47 10E12/L (ref 3.8–5.2)
RBC #/AREA URNS AUTO: 2 /HPF (ref 0–2)
SOURCE: ABNORMAL
SP GR UR STRIP: 1 (ref 1–1.03)
SPECIMEN EXP DATE BLD: NORMAL
SPECIMEN SOURCE FLD: NORMAL
SQUAMOUS #/AREA URNS AUTO: 6 /HPF (ref 0–1)
UROBILINOGEN UR STRIP-MCNC: NORMAL MG/DL (ref 0–2)
WBC # BLD AUTO: 16.8 10E9/L (ref 4–11)
WBC # FLD AUTO: 2360 /UL
WBC #/AREA URNS AUTO: 24 /HPF (ref 0–5)

## 2019-03-26 PROCEDURE — 86900 BLOOD TYPING SEROLOGIC ABO: CPT | Performed by: OBSTETRICS & GYNECOLOGY

## 2019-03-26 PROCEDURE — 59074 FETAL FLUID DRAINAGE W/US: CPT | Performed by: OBSTETRICS & GYNECOLOGY

## 2019-03-26 PROCEDURE — 36000047 ZZH SURGERY LEVEL 1 EA 15 ADDTL MIN - UMMC: Performed by: OBSTETRICS & GYNECOLOGY

## 2019-03-26 PROCEDURE — 86850 RBC ANTIBODY SCREEN: CPT | Performed by: OBSTETRICS & GYNECOLOGY

## 2019-03-26 PROCEDURE — 89051 BODY FLUID CELL COUNT: CPT | Performed by: OBSTETRICS & GYNECOLOGY

## 2019-03-26 PROCEDURE — G0378 HOSPITAL OBSERVATION PER HR: HCPCS

## 2019-03-26 PROCEDURE — 25000132 ZZH RX MED GY IP 250 OP 250 PS 637: Performed by: STUDENT IN AN ORGANIZED HEALTH CARE EDUCATION/TRAINING PROGRAM

## 2019-03-26 PROCEDURE — 25800030 ZZH RX IP 258 OP 636: Performed by: OBSTETRICS & GYNECOLOGY

## 2019-03-26 PROCEDURE — 86901 BLOOD TYPING SEROLOGIC RH(D): CPT | Performed by: OBSTETRICS & GYNECOLOGY

## 2019-03-26 PROCEDURE — 25000125 ZZHC RX 250: Performed by: OBSTETRICS & GYNECOLOGY

## 2019-03-26 PROCEDURE — 87653 STREP B DNA AMP PROBE: CPT | Performed by: OBSTETRICS & GYNECOLOGY

## 2019-03-26 PROCEDURE — 87086 URINE CULTURE/COLONY COUNT: CPT | Performed by: STUDENT IN AN ORGANIZED HEALTH CARE EDUCATION/TRAINING PROGRAM

## 2019-03-26 PROCEDURE — 81001 URINALYSIS AUTO W/SCOPE: CPT | Performed by: STUDENT IN AN ORGANIZED HEALTH CARE EDUCATION/TRAINING PROGRAM

## 2019-03-26 PROCEDURE — 36000045 ZZH SURGERY LEVEL 1 1ST 30 MIN - UMMC: Performed by: OBSTETRICS & GYNECOLOGY

## 2019-03-26 PROCEDURE — 40000170 ZZH STATISTIC PRE-PROCEDURE ASSESSMENT II: Performed by: OBSTETRICS & GYNECOLOGY

## 2019-03-26 PROCEDURE — 27210794 ZZH OR GENERAL SUPPLY STERILE: Performed by: OBSTETRICS & GYNECOLOGY

## 2019-03-26 PROCEDURE — 76815 OB US LIMITED FETUS(S): CPT

## 2019-03-26 PROCEDURE — 85027 COMPLETE CBC AUTOMATED: CPT | Performed by: OBSTETRICS & GYNECOLOGY

## 2019-03-26 RX ORDER — ACETAMINOPHEN 325 MG/1
650 TABLET ORAL EVERY 4 HOURS PRN
Status: DISCONTINUED | OUTPATIENT
Start: 2019-03-26 | End: 2019-03-27 | Stop reason: HOSPADM

## 2019-03-26 RX ORDER — FENTANYL CITRATE 50 UG/ML
25-50 INJECTION, SOLUTION INTRAMUSCULAR; INTRAVENOUS
Status: CANCELLED | OUTPATIENT
Start: 2019-03-26

## 2019-03-26 RX ORDER — CITRIC ACID/SODIUM CITRATE 334-500MG
30 SOLUTION, ORAL ORAL ONCE
Status: DISCONTINUED | OUTPATIENT
Start: 2019-03-26 | End: 2019-03-27 | Stop reason: HOSPADM

## 2019-03-26 RX ORDER — ONDANSETRON 2 MG/ML
4 INJECTION INTRAMUSCULAR; INTRAVENOUS EVERY 30 MIN PRN
Status: CANCELLED | OUTPATIENT
Start: 2019-03-26

## 2019-03-26 RX ORDER — SODIUM CHLORIDE, SODIUM LACTATE, POTASSIUM CHLORIDE, CALCIUM CHLORIDE 600; 310; 30; 20 MG/100ML; MG/100ML; MG/100ML; MG/100ML
INJECTION, SOLUTION INTRAVENOUS CONTINUOUS
Status: CANCELLED | OUTPATIENT
Start: 2019-03-26

## 2019-03-26 RX ORDER — ACETAMINOPHEN 325 MG/1
325-650 TABLET ORAL EVERY 4 HOURS PRN
Status: DISCONTINUED | OUTPATIENT
Start: 2019-03-26 | End: 2019-03-26 | Stop reason: ALTCHOICE

## 2019-03-26 RX ORDER — ONDANSETRON 4 MG/1
4 TABLET, ORALLY DISINTEGRATING ORAL EVERY 30 MIN PRN
Status: CANCELLED | OUTPATIENT
Start: 2019-03-26

## 2019-03-26 RX ORDER — NALOXONE HYDROCHLORIDE 0.4 MG/ML
.1-.4 INJECTION, SOLUTION INTRAMUSCULAR; INTRAVENOUS; SUBCUTANEOUS
Status: CANCELLED | OUTPATIENT
Start: 2019-03-26 | End: 2019-03-27

## 2019-03-26 RX ORDER — LIDOCAINE HYDROCHLORIDE 10 MG/ML
INJECTION, SOLUTION EPIDURAL; INFILTRATION; INTRACAUDAL; PERINEURAL
Status: DISCONTINUED
Start: 2019-03-26 | End: 2019-03-26 | Stop reason: HOSPADM

## 2019-03-26 RX ORDER — PROCHLORPERAZINE 25 MG
25 SUPPOSITORY, RECTAL RECTAL EVERY 12 HOURS PRN
Status: DISCONTINUED | OUTPATIENT
Start: 2019-03-26 | End: 2019-03-27 | Stop reason: HOSPADM

## 2019-03-26 RX ORDER — LIDOCAINE 40 MG/G
CREAM TOPICAL
Status: DISCONTINUED | OUTPATIENT
Start: 2019-03-26 | End: 2019-03-27 | Stop reason: HOSPADM

## 2019-03-26 RX ORDER — HYDROXYZINE HYDROCHLORIDE 50 MG/1
50 TABLET, FILM COATED ORAL
Status: DISCONTINUED | OUTPATIENT
Start: 2019-03-26 | End: 2019-03-27 | Stop reason: HOSPADM

## 2019-03-26 RX ORDER — LABETALOL 20 MG/4 ML (5 MG/ML) INTRAVENOUS SYRINGE
10
Status: CANCELLED | OUTPATIENT
Start: 2019-03-26

## 2019-03-26 RX ORDER — HYDROXYZINE HYDROCHLORIDE 50 MG/1
100 TABLET, FILM COATED ORAL
Status: DISCONTINUED | OUTPATIENT
Start: 2019-03-26 | End: 2019-03-27 | Stop reason: HOSPADM

## 2019-03-26 RX ORDER — PRENATAL VIT/IRON FUM/FOLIC AC 27MG-0.8MG
1 TABLET ORAL DAILY
Status: DISCONTINUED | OUTPATIENT
Start: 2019-03-26 | End: 2019-03-27 | Stop reason: HOSPADM

## 2019-03-26 RX ORDER — SODIUM CHLORIDE, SODIUM LACTATE, POTASSIUM CHLORIDE, CALCIUM CHLORIDE 600; 310; 30; 20 MG/100ML; MG/100ML; MG/100ML; MG/100ML
INJECTION, SOLUTION INTRAVENOUS CONTINUOUS
Status: DISCONTINUED | OUTPATIENT
Start: 2019-03-26 | End: 2019-03-26

## 2019-03-26 RX ORDER — WATER 10 ML/10ML
10 INJECTION INTRAMUSCULAR; INTRAVENOUS; SUBCUTANEOUS ONCE
Status: DISCONTINUED | OUTPATIENT
Start: 2019-03-26 | End: 2019-03-27 | Stop reason: HOSPADM

## 2019-03-26 RX ORDER — VECURONIUM BROMIDE 1 MG/ML
1 INJECTION, POWDER, LYOPHILIZED, FOR SOLUTION INTRAVENOUS ONCE
Status: COMPLETED | OUTPATIENT
Start: 2019-03-26 | End: 2019-03-26

## 2019-03-26 RX ORDER — ONDANSETRON 2 MG/ML
4 INJECTION INTRAMUSCULAR; INTRAVENOUS EVERY 6 HOURS PRN
Status: DISCONTINUED | OUTPATIENT
Start: 2019-03-26 | End: 2019-03-27 | Stop reason: HOSPADM

## 2019-03-26 RX ORDER — MEPERIDINE HYDROCHLORIDE 25 MG/ML
12.5 INJECTION INTRAMUSCULAR; INTRAVENOUS; SUBCUTANEOUS
Status: CANCELLED | OUTPATIENT
Start: 2019-03-26

## 2019-03-26 RX ADMIN — HYDROXYZINE HYDROCHLORIDE 50 MG: 50 TABLET, FILM COATED ORAL at 21:13

## 2019-03-26 RX ADMIN — SODIUM CHLORIDE, POTASSIUM CHLORIDE, SODIUM LACTATE AND CALCIUM CHLORIDE: 600; 310; 30; 20 INJECTION, SOLUTION INTRAVENOUS at 09:58

## 2019-03-26 RX ADMIN — PRENATAL VIT W/ FE FUMARATE-FA TAB 27-0.8 MG 1 TABLET: 27-0.8 TAB at 21:13

## 2019-03-26 RX ADMIN — ACETAMINOPHEN 650 MG: 325 TABLET, FILM COATED ORAL at 16:56

## 2019-03-26 ASSESSMENT — MIFFLIN-ST. JEOR: SCORE: 1557.29

## 2019-03-26 NOTE — PLAN OF CARE
Parul Herrera presents for Thoracentisis. Patient reports some contractions, denies bleeding or LOF.     History reviewed. No pertinent past medical history.  Past Surgical History:   Procedure Laterality Date     FETAL INJECTION W/ OR W/O ULTRASOUND GUIDANCE N/A 1/31/2019    Procedure: FETAL INJECTION W/ OR W/O ULTRASOUND GUIDANCE;  Surgeon: Michelle Amaya MD;  Location: [facility] L+D     FETAL INJECTION W/ OR W/O ULTRASOUND GUIDANCE N/A 2/12/2019    Procedure: FETAL INJECTION W/ OR W/O ULTRASOUND GUIDANCE AND MATERNAL FETAL MEDICINE SONOGRAPHER WILL COME;  Surgeon: Eliceo Rosas MD;  Location: [facility] L+D       Dr Cat notified of patient's arrival and condition.    Plan:  -Thoracentisis at 1030.

## 2019-03-26 NOTE — ANESTHESIA PREPROCEDURE EVALUATION
Anesthesia Pre-Procedure Evaluation    Patient: Parul Herrera   MRN:     [id number] Gender:   female   Age:    33 year old :      1985        Preoperative Diagnosis: Pregnancy   Procedure(s):  FETAL INJECTION W/ OR W/O ULTRASOUND GUIDANCE     No past medical history on file.   Past Surgical History:   Procedure Laterality Date     FETAL INJECTION W/ OR W/O ULTRASOUND GUIDANCE N/A 2019    Procedure: FETAL INJECTION W/ OR W/O ULTRASOUND GUIDANCE;  Surgeon: Michelle Amaya MD;  Location: [facility] L+D     FETAL INJECTION W/ OR W/O ULTRASOUND GUIDANCE N/A 2019    Procedure: FETAL INJECTION W/ OR W/O ULTRASOUND GUIDANCE AND MATERNAL FETAL MEDICINE SONOGRAPHER WILL COME;  Surgeon: Eliceo Rosas MD;  Location: [facility] L+D          Anesthesia Evaluation     .             ROS/MED HX    ENT/Pulmonary:  - neg pulmonary ROS     Neurologic:  - neg neurologic ROS     Cardiovascular:  - neg cardiovascular ROS       METS/Exercise Tolerance:     Hematologic:  - neg hematologic  ROS       Musculoskeletal:  - neg musculoskeletal ROS       GI/Hepatic:  - neg GI/hepatic ROS       Renal/Genitourinary:         Endo:  - neg endo ROS       Psychiatric:  - neg psychiatric ROS       Infectious Disease:  - neg infectious disease ROS       Malignancy:         Other:                         PHYSICAL EXAM:   Mental Status/Neuro: A/A/O   Airway: Facies: Feasible  Mallampati: I  Mouth/Opening: Full  TM distance: > 6 cm  Neck ROM: Full   Respiratory:    CV:    Comments:                    Lab Results   Component Value Date    WBC 16.8 (H) 2019    HGB 13.0 2019    HCT 38.9 2019     2019       Preop Vitals  BP Readings from Last 3 Encounters:   19 128/74   19 122/80   19 131/85    Pulse Readings from Last 3 Encounters:   19 98   19 103   19 88      Resp Readings from Last 3 Encounters:   19 20   19 18   19 16    SpO2 Readings from Last 3 Encounters:  "  03/25/19 99%   01/31/19 100%      Temp Readings from Last 1 Encounters:   02/12/19 36.4  C (97.6  F) (Oral)    Ht Readings from Last 1 Encounters:   02/12/19 1.727 m (5' 7.99\")      Wt Readings from Last 1 Encounters:   03/25/19 79.4 kg (175 lb)    Estimated body mass index is 26.61 kg/m  as calculated from the following:    Height as of 2/12/19: 1.727 m (5' 7.99\").    Weight as of 3/25/19: 79.4 kg (175 lb).     LDA:  Peripheral IV 03/26/19 Right;Posterior Lower forearm (Active)   Number of days: 0            Assessment:   ASA SCORE: 2    NPO Status: > 6 hours since completed [employer]   Documentation: H&P complete; Preop Testing complete; Consents complete   Proceeding: Proceed without further delay  Tobacco Use:  NO Active use of Tobacco/UNKNOWN Tobacco use status     Plan:   Anes. Type:  Anesthesia Standby; Regional     RA-Location/Type: Spinal   Pre-Induction: None   Induction:  Not applicable   Airway: Native Airway   Access/Monitoring: No Access Planned   Maintenance: Not Applicable   Emergence: Not Applicable   Logistics: Postop directly to Phase 2 (or similar)     PONV Management:  Adult Risk Factors: Female, Non-Smoker  Prevention: Ondansetron     CONSENT: Direct conversation   Plan and risks discussed with: Patient   Blood Products: Consented (ALL Blood Products)                           Tico Francois MD  "

## 2019-03-26 NOTE — DISCHARGE SUMMARY
[facility] Discharge Summary    Parul Herrera MRN# [id number]   Age: 33 year old YOB: 1985     Date of Admission:  3/26/2019  Date of Discharge:  3/27/2019  Admitting Physician:  Yoselyn Cat MD  Discharge Physician:  Yoselyn Cat MD     Admission Diagnosis:   at 32w5d  Recurrent fetal chylothorax    Discharge Diagnosis:   at 32w6d  Recurrent fetal chylothorax    Procedures:  Fetal thoracentesis    Consultations:  None    Medications prior to admission:    No current facility-administered medications on file prior to encounter.   Current Outpatient Medications on File Prior to Encounter:  Prenatal Vit-Fe Fumarate-FA (PRENATAL MULTIVITAMIN PLUS IRON) 27-0.8 MG TABS per tablet Take 1 tablet by mouth daily     Brief History of Presentation:    Parul Herrera is a 34 yo  at 32w5d by 8w0d US admitted after therapeutic fetal thoracentesis due to a reaccumulation of a left sided hydrothorax. She previously had a right thoracentesis on  and again on , and then bilateral shunts placed with Dr. Allison at [facility] on 2/15 (right) and 3/5 (left) which is no longer functioning. She has had a full workup in this pregnancy (neg CMV, Parvo, HSV, Toxo and RPR) and had an amniocentesis with normal XY chromosomes and a normal microarray. She is s/p Z on - and 3/5-3/6/19.      On admission she complained of some mild irregular contractions, but she denies leakage of fluid, vaginal bleeding and reporting good fetal movements.     Hospital Course:    Ms. Herrera was admitted overnight for observation due to cramping. She did not have any contractions overnight and felt well on day of discharge. A comp US was completed on day of discharge and showed left fetal pleural effusion with normal cardiac activity/function.     Discharge Instructions:  Call or present to labor and delivery if you experience:   -Regular painful contractions concerning for  labor   -Leakage of fluid concerning for ruptured membranes   -Decreased fetal movement   -Bright red vaginal bleeding    -Headache, vision changes, upper abdominal pain, significant increase in swelling,   generalized unwell feeling    Follow up:  Follow up in MFM clinic on 3/28    Discharge Medications:     Review of your medicines      CONTINUE these medicines which have NOT CHANGED      Dose / Directions   prenatal multivitamin w/iron 27-0.8 MG tablet      Dose:  1 tablet  Take 1 tablet by mouth daily  Refills:  0        STOP taking    NIFEDIPINE PO                 MFM Attending Addendum    I, Yoselyn Cat, saw and evaluated this patient prior to discharge.  I have discussed the care of Ms. Herrera with the resident and agree with the plan of care as documented above.      I personally reviewed vital signs, medications, labs and imaging.      I personally spent a total of 15 minutes with Parul Herrera on discharge activities including calling guidelines.  Appropriate follow-up in place.    Date of service (when I saw the patient): March 27, 2019      Yoselyn Cat MD  , OB/GYN  Maternal-Fetal Medicine  [email or web address]  [phone] (Academic office)  [phone] (Pager)

## 2019-03-26 NOTE — OP NOTE
Operative Note  Procedure: Ultrasound-Guided Fetal Thoracentesis  Pre-Operative Diagnosis: Reaccumulating fetal left sided pleural effusion  Post-Operative Diagnosis: Same, decrease in size of effusion   Surgeon: Dr. Yoselyn Cat  Assistants: Dr. Nanci Alcazar, Erika Walsh MD, MFM fellow PGY7 and Lance Ellis MD, MFM Fellow PGY5  Anesthesia: local  IVF: 1000 mL of crystalloid    EBL: minimal   Specimen: Pleural fluid to laboratory for cell count  Prophylaxis: Sequential compression devices   Findings: cephalic infant, anterior placenta, normal amniotic fluid, large left pleural effusion with mediastinal shift    Indications: This is a 34 yo  at 32 & 5/7 weeks who is being followed for bilateral pleural effusions.  She had previously undergone right thoracentesis on  and again on , and then bilateral shunts placed on 2/15 (right) and 3/5 (left).  On recent ultrasounds, the left pleural effusion was noted to have increased in size. Given this finding, treatment with repeat thoracentesis was discussed in detail with the patient. After review of risks/benefits, the patient decided to proceed with thoracentesis, with possible  in the event of non-reassuring fetal status. Risks were reviewed, questions answered and consent signed.    Procedure: The patient was taken to the OR where she was prepped and draped in the normal sterile fashion, dorsal supine position with a rightward tilt. Under continuous ultrasound guidance, the fetus was noted to be in cephalic presentation with the left chest up. The left fetal arm was sitting anteriorly and upper arm was chosen for vecuronium injection as the fetal legs were very lateral. Two cc of 1% lidocaine was injected subcutaneously. Under continuous ultrasound guidance 22 gauge needle was advanced percutaneously, through the placenta into the fetal deltoid muscle where 2.1 mL of dilute vecuronium (for a total of 0.2 mg) was injected for fetal  paralysis. The needle was then withdrawn. The pleural effusion was then identified and access mapped out.  An ideal trajectory was identified and two cc of 1% lidocaine was infiltrated subcutaneously, however before we were able to proceed, the fetal arm moved to block this area of the chest. A new location was identified and we again injected another two cc of 1% lidocaine.  Under continuous ultrasound guidance a new 22 gauge needle was advanced percutaneously, through the placenta, through the fetal chest into the pleural effusion and 40 cc of clear dark yellow fluid were drained.   A sample was sent to the laboratory for cell count.  Once the pleural space was collapsed the needle was withdrawn.    The patient and the fetus tolerated the procedure well.  Fetal heart rate was normal throughout the procedure and at the end of the procedure.  The amniotic fluid was normal.  The drapes were removed and the patient was kept on continuous monitoring.  She is Rh positive.      MONTANA Walsh MD    Attending Addendum    I was present and scrubbed and performed with the above described surgery with Dr. Walsh.  I agree with the above documentation.  The surgery was indicated and there were no apparent complications.        Yoselyn Cat MD  , OB/GYN  Maternal-Fetal Medicine  [email or web address]  [phone] (Academic office)  [phone] (Pager)

## 2019-03-26 NOTE — PLAN OF CARE
Data: Pt to 479 at 1438 ambulatory. PIV infusing without complications, pt denies any pain, denies any nausea and vomiting.  Interventions: IV to pump, EFM applied.  Response: stable.  Plan: Patient instructed to notify RN for pain or nausea, routine post procedural cares.

## 2019-03-27 ENCOUNTER — HOSPITAL ENCOUNTER (OUTPATIENT)
Dept: ULTRASOUND IMAGING | Facility: CLINIC | Age: 34
Setting detail: OBSERVATION
End: 2019-03-27
Attending: OBSTETRICS & GYNECOLOGY
Payer: COMMERCIAL

## 2019-03-27 ENCOUNTER — TELEPHONE (OUTPATIENT)
Dept: MATERNAL FETAL MEDICINE | Facility: CLINIC | Age: 34
End: 2019-03-27

## 2019-03-27 VITALS
WEIGHT: 175 LBS | DIASTOLIC BLOOD PRESSURE: 79 MMHG | RESPIRATION RATE: 18 BRPM | SYSTOLIC BLOOD PRESSURE: 133 MMHG | BODY MASS INDEX: 26.52 KG/M2 | HEIGHT: 68 IN | TEMPERATURE: 98.1 F

## 2019-03-27 LAB
BACTERIA SPEC CULT: NORMAL
GP B STREP DNA SPEC QL NAA+PROBE: NEGATIVE
Lab: NORMAL
SPECIMEN SOURCE: NORMAL
SPECIMEN SOURCE: NORMAL

## 2019-03-27 PROCEDURE — G0378 HOSPITAL OBSERVATION PER HR: HCPCS

## 2019-03-27 PROCEDURE — 76819 FETAL BIOPHYS PROFIL W/O NST: CPT | Performed by: OBSTETRICS & GYNECOLOGY

## 2019-03-27 PROCEDURE — 76816 OB US FOLLOW-UP PER FETUS: CPT

## 2019-03-27 RX ORDER — BUPIVACAINE HYDROCHLORIDE 7.5 MG/ML
INJECTION, SOLUTION INTRASPINAL
Status: DISCONTINUED
Start: 2019-03-27 | End: 2019-03-27 | Stop reason: HOSPADM

## 2019-03-27 NOTE — DISCHARGE INSTRUCTIONS
Discharge Instruction for Undelivered Patients      You were seen for: fetal pleural effusions  We Consulted: DR Cat  You had (Test or Medicine):fetal thoracenteses     Diet:   Drink 8 to 12 glasses of liquids (milk, juice, water) every day.  You may eat meals and snacks.     Activity:  Count fetal kicks everyday (see handout)  Call your doctor or nurse midwife if your baby is moving less than usual.     Call your provider if you notice:  Swelling in your face or increased swelling in your hands or legs.  Headaches that are not relieved by Tylenol (acetaminophen).  Changes in your vision (blurring: seeing spots or stars.)  Nausea (sick to your stomach) and vomiting (throwing up).   Weight gain of 5 pounds or more per week.  Heartburn that doesn't go away.  Signs of bladder infection: pain when you urinate (use the toilet), need to go more often and more urgently.  The bag of marino (rupture of membranes) breaks, or you notice leaking in your underwear.  Bright red blood in your underwear.  Abdominal (lower belly) or stomach pain.  *If less than 34 weeks: Contractions (tightenings) more than 6 times in one hour.  Increase or change in vaginal discharge (note the color and amount)      Follow-up:  As scheduled in the clinic

## 2019-03-27 NOTE — PLAN OF CARE
VSS. Pt denies pain or LOF. Feeling intermittent cramping/contractions. Palpate mild. EFM AGA - see flowsheet. Plan to monitor overnight and have comprehensive US in morning. Continue plan of care.

## 2019-03-27 NOTE — PLAN OF CARE
Afebrile, VSS. Pt denies bldg, LOF, pain. Ctx intermittently on toco, pt not feeling. FHR AGA. Plan to do US this morning. Continue POC, contact MD with questions/concerns.

## 2019-03-27 NOTE — PLAN OF CARE
[facility]ul [facility] yesterday, 3/[facility], for a scheduled fetal thoracenteses. She stayed overnight for observation due to decelerations yesterday in triage while waiting for procedure and follow up U/S this morning. No further decelerations overnight. FHTs AGA. Pt reports occasional contraction, but has not felt any overnight. Denies vaginal bleeding, leaking of fluid, s/s of preeclampsia, and pain. Plan of care reviewed with Parul. Questions answered by Dr Cat. Patient in agreement with plan and verbalizes understanding of when to seek medical attention. Follow up appointment tomorrow at [facility]. Patient left ambulatory at 0820

## 2019-03-27 NOTE — TELEPHONE ENCOUNTER
Writer called and let Parul know that she is scheduled with Dr. Bonilla in Peds Surgery on 4/8 at 945. Pt agrees with plan, appointment time confirmed for tomorrow.  Ariadna Easley RN

## 2019-03-27 NOTE — PROVIDER NOTIFICATION
03/27/19 0805   Provider Notification   Provider Name/Title Dr Cat   Method of Notification At Bedside   Notification Reason Other (Comment)  (am rounds)   Plan to discharge to home. Follow up appointment scheduled at [facility] tomorrow.

## 2019-03-27 NOTE — PROGRESS NOTES
MFM Antepartum Progress Note    SUBJECTIVE  33 year old old  32w6d who is HD#2 for monitoring after fetal thoracentesis. She is doing well this morning. Feels FM. No ctx overnight, had one ctx this morning during the ultrasound. Denies systemic complaints.    OBJECTIVE     Vital signs:    Vitals:    19 2040 19 2206 19 0048 19 0725   BP: 134/74 108/60 108/58 133/79   Resp: 16 18 16 18   Temp: 98.2  F (36.8  C)  98  F (36.7  C) 98.1  F (36.7  C)   TempSrc: Oral  Oral Oral   Weight:       Height:           General: Alert and pleasant  Abd: Gravid, nontender    LAB  Results for orders placed or performed during the hospital encounter of 19 (from the past 24 hour(s))   ABO/Rh type and screen   Result Value Ref Range    ABO A     RH(D) Pos     Antibody Screen Neg     Test Valid Only At          [facility]    Specimen Expires 2019    CBC with platelets   Result Value Ref Range    WBC 16.8 (H) 4.0 - 11.0 10e9/L    RBC Count 4.47 3.8 - 5.2 10e12/L    Hemoglobin 13.0 11.7 - 15.7 g/dL    Hematocrit 38.9 35.0 - 47.0 %    MCV 87 78 - 100 fl    MCH 29.1 26.5 - 33.0 pg    MCHC 33.4 31.5 - 36.5 g/dL    RDW 12.9 10.0 - 15.0 %    Platelet Count 300 150 - 450 10e9/L   UA with Microscopic   Result Value Ref Range    Color Urine Light Yellow     Appearance Urine Clear     Glucose Urine Negative NEG^Negative mg/dL    Bilirubin Urine Negative NEG^Negative    Ketones Urine 5 (A) NEG^Negative mg/dL    Specific Gravity Urine 1.005 1.003 - 1.035    Blood Urine Negative NEG^Negative    pH Urine 7.0 5.0 - 7.0 pH    Protein Albumin Urine Negative NEG^Negative mg/dL    Urobilinogen mg/dL Normal 0.0 - 2.0 mg/dL    Nitrite Urine Negative NEG^Negative    Leukocyte Esterase Urine Large (A) NEG^Negative    Source Midstream Urine     WBC Urine 24 (H) 0 - 5 /HPF    RBC Urine 2 0 - 2 /HPF    Bacteria Urine Few (A) NEG^Negative /HPF    Squamous Epithelial /HPF Urine 6  "(H) 0 - 1 /HPF    Mucous Urine Present (A) NEG^Negative /LPF   Urine Culture Aerobic Bacterial   Result Value Ref Range    Specimen Description Midstream Urine     Special Requests Specimen received in preservative     Culture Micro PENDING    Cell count with differential fluid   Result Value Ref Range    Body Fluid Analysis Source Pleural fluid     % Neutrophils Fluid 1 %    % Lymphocytes Fluid 73 %    % Mono/Macro Fluid 22 %    % Eosinophils Fluid 4 %    Color Fluid Yellow     Appearance Fluid Clear     WBC Fluid 2360 /uL     ASSESSMENT AND PLAN  34 yo  at 32w5d by 8w0d US POD#1 status post fetal thoracentesis due to a reaccumulation of a left sided pleural effusion, admitted overnight for monitoring now stable for discharge home.       Fetal  pleural effusion s/p fetal thoracentesis  - US today shows large left side pleural effusion with mediastinal shift, normal cardiac function/activity. Discussed the results of the US for patient and the possibility of needing weekly thoracenteses.   - Follow-up in [facility] clinic tomorrow   - CBC 3/26 notable for leukocytosis. Has had elevated an white count since January    [facility]  - Patient is Rh+  -  GBS pending  - Urine cx pending    Dispo: discharge home today    Raquel Kerns, MS4  P: [phone]    Scribe Disclosure:   I, Raquel Kerns, am serving as a scribe; to document services personally performed by Dr. Cat based on data collection and the provider's statements to me.     Maternal-Fetal Medicine Attending Addendum    I have discussed the care of Ms. Herrera with the medical student during morning rounds.    /79   Temp 98.1  F (36.7  C) (Oral)   Resp 18   Ht 1.727 m (5' 8\")   Wt 79.4 kg (175 lb)  BMI 26.61 kg/m    GEN: NAD  ABD: gravid, NT  FHT: moderate variability, reactive, some loss of contact  TOCO: no contractions  NST interpretation for today: appropriate for GA  BPP:  today.      A/P: 33 year old  32w6d admitted for fetal " monitoring/observation status post fetal thoracocentesis.  Has follow-up tomorrow, stable for discharge home.  Patient agrees with plan of care and all questions answered.     Please see the discharge summary for my time attestation.      Date of service (when I saw the patient): March 27, 2019      Yoselyn Cat MD  , OB/GYN  Maternal-Fetal Medicine  [email or web address]  [phone] (Academic office)  [phone] (Pager)

## 2019-03-27 NOTE — PROVIDER NOTIFICATION
03/27/19 0623   Provider Notification   Provider Name/Title Dr. Mai   Method of Notification Electronic Page   Notification Reason Patient Request     Pt wants to come off EFM for a little while before US to shower, get ready for the day. MD agreeable to plan.

## 2019-03-27 NOTE — PROVIDER NOTIFICATION
03/26/19 1817   Provider Notification   Provider Name/Title Dr. Cat   Method of Notification In Department   Notification Reason Status Update   Per Dr. Cat pt ok to come off monitor for short period to ambulate around unit.

## 2019-03-27 NOTE — PROVIDER NOTIFICATION
03/26/19 2040   Provider Notification   Provider Name/Title Dr. Mai   Method of Notification Electronic Page   Request Evaluate - Remote   Notification Reason Other (Comment)   Pt requesting sleep meds. Thanks

## 2019-03-28 ENCOUNTER — HOSPITAL ENCOUNTER (OUTPATIENT)
Dept: ULTRASOUND IMAGING | Facility: CLINIC | Age: 34
Discharge: HOME | End: 2019-03-28
Attending: OBSTETRICS & GYNECOLOGY | Admitting: OBSTETRICS & GYNECOLOGY
Payer: COMMERCIAL

## 2019-03-28 ENCOUNTER — OFFICE VISIT (OUTPATIENT)
Dept: MATERNAL FETAL MEDICINE | Facility: CLINIC | Age: 34
End: 2019-03-28
Attending: OBSTETRICS & GYNECOLOGY
Payer: COMMERCIAL

## 2019-03-28 DIAGNOSIS — O35.CXX0 PLEURAL EFFUSION, FETAL, AFFECTING CARE OF MOTHER, ANTEPARTUM: ICD-10-CM

## 2019-03-28 DIAGNOSIS — O35.CXX0 PLEURAL EFFUSION, FETAL, AFFECTING CARE OF MOTHER, ANTEPARTUM: Primary | ICD-10-CM

## 2019-03-28 DIAGNOSIS — O09.93 SUPERVISION OF HIGH RISK PREGNANCY IN THIRD TRIMESTER: ICD-10-CM

## 2019-03-28 DIAGNOSIS — O35.9XX0 SUSPECTED FETAL ABNORMALITY AFFECTING MANAGEMENT OF MOTHER, SINGLE OR UNSPECIFIED FETUS: ICD-10-CM

## 2019-03-28 PROCEDURE — 76816 OB US FOLLOW-UP PER FETUS: CPT

## 2019-03-28 PROCEDURE — 76819 FETAL BIOPHYS PROFIL W/O NST: CPT | Performed by: OBSTETRICS & GYNECOLOGY

## 2019-03-29 ENCOUNTER — OFFICE VISIT (OUTPATIENT)
Dept: MATERNAL FETAL MEDICINE | Facility: CLINIC | Age: 34
End: 2019-03-29
Attending: OBSTETRICS & GYNECOLOGY
Payer: COMMERCIAL

## 2019-03-29 ENCOUNTER — HOSPITAL ENCOUNTER (OUTPATIENT)
Dept: ULTRASOUND IMAGING | Facility: CLINIC | Age: 34
Discharge: HOME | End: 2019-03-29
Attending: OBSTETRICS & GYNECOLOGY | Admitting: OBSTETRICS & GYNECOLOGY
Payer: COMMERCIAL

## 2019-03-29 DIAGNOSIS — O35.9XX0 SUSPECTED FETAL ABNORMALITY AFFECTING MANAGEMENT OF MOTHER, SINGLE OR UNSPECIFIED FETUS: ICD-10-CM

## 2019-03-29 DIAGNOSIS — O35.CXX0 PLEURAL EFFUSION OF FETUS AFFECTING MANAGEMENT OF MOTHER, ANTEPARTUM: Primary | ICD-10-CM

## 2019-03-29 PROCEDURE — 76816 OB US FOLLOW-UP PER FETUS: CPT

## 2019-04-01 ENCOUNTER — HOSPITAL ENCOUNTER (OUTPATIENT)
Dept: ULTRASOUND IMAGING | Facility: CLINIC | Age: 34
Discharge: HOME | End: 2019-04-01
Attending: OBSTETRICS & GYNECOLOGY | Admitting: OBSTETRICS & GYNECOLOGY
Payer: COMMERCIAL

## 2019-04-01 ENCOUNTER — OFFICE VISIT (OUTPATIENT)
Dept: MATERNAL FETAL MEDICINE | Facility: CLINIC | Age: 34
End: 2019-04-01
Attending: OBSTETRICS & GYNECOLOGY
Payer: COMMERCIAL

## 2019-04-01 DIAGNOSIS — O35.CXX0 PLEURAL EFFUSION, FETAL, AFFECTING CARE OF MOTHER, ANTEPARTUM: ICD-10-CM

## 2019-04-01 DIAGNOSIS — O35.CXX0 PLEURAL EFFUSION OF FETUS AFFECTING MANAGEMENT OF MOTHER, ANTEPARTUM: Primary | ICD-10-CM

## 2019-04-01 PROCEDURE — 76819 FETAL BIOPHYS PROFIL W/O NST: CPT | Performed by: OBSTETRICS & GYNECOLOGY

## 2019-04-01 PROCEDURE — 76816 OB US FOLLOW-UP PER FETUS: CPT

## 2019-04-02 NOTE — PROGRESS NOTES
Please see ultrasound report under imaging tab for details on ultrasound performed today.    Yoselyn Cat MD  , OB/GYN  Maternal-Fetal Medicine  [email or web address]  [phone] (Academic office)  [phone] (Pager)

## 2019-04-03 ENCOUNTER — HOSPITAL ENCOUNTER (OUTPATIENT)
Dept: ULTRASOUND IMAGING | Facility: CLINIC | Age: 34
Discharge: HOME | End: 2019-04-03
Attending: OBSTETRICS & GYNECOLOGY | Admitting: OBSTETRICS & GYNECOLOGY
Payer: COMMERCIAL

## 2019-04-03 ENCOUNTER — OFFICE VISIT (OUTPATIENT)
Dept: MATERNAL FETAL MEDICINE | Facility: CLINIC | Age: 34
End: 2019-04-03
Attending: OBSTETRICS & GYNECOLOGY
Payer: COMMERCIAL

## 2019-04-03 DIAGNOSIS — O35.CXX0 PLEURAL EFFUSION, FETAL, AFFECTING CARE OF MOTHER, ANTEPARTUM: ICD-10-CM

## 2019-04-03 DIAGNOSIS — O35.CXX0 PLEURAL EFFUSION OF FETUS AFFECTING MANAGEMENT OF MOTHER, ANTEPARTUM: Primary | ICD-10-CM

## 2019-04-03 PROCEDURE — 76816 OB US FOLLOW-UP PER FETUS: CPT

## 2019-04-03 PROCEDURE — 76819 FETAL BIOPHYS PROFIL W/O NST: CPT

## 2019-04-05 ENCOUNTER — OFFICE VISIT (OUTPATIENT)
Dept: MATERNAL FETAL MEDICINE | Facility: CLINIC | Age: 34
End: 2019-04-05
Attending: OBSTETRICS & GYNECOLOGY
Payer: COMMERCIAL

## 2019-04-05 ENCOUNTER — HOSPITAL ENCOUNTER (OUTPATIENT)
Dept: ULTRASOUND IMAGING | Facility: CLINIC | Age: 34
Discharge: HOME | End: 2019-04-05
Attending: OBSTETRICS & GYNECOLOGY | Admitting: OBSTETRICS & GYNECOLOGY
Payer: COMMERCIAL

## 2019-04-05 DIAGNOSIS — O35.9XX0 SUSPECTED FETAL ABNORMALITY AFFECTING MANAGEMENT OF MOTHER, SINGLE OR UNSPECIFIED FETUS: ICD-10-CM

## 2019-04-05 DIAGNOSIS — O35.9XX0 FETAL ABNORMALITY IN ANTEPARTUM PREGNANCY, SINGLE OR UNSPECIFIED FETUS: Primary | ICD-10-CM

## 2019-04-05 PROCEDURE — 76819 FETAL BIOPHYS PROFIL W/O NST: CPT | Performed by: OBSTETRICS & GYNECOLOGY

## 2019-04-05 PROCEDURE — 76816 OB US FOLLOW-UP PER FETUS: CPT

## 2019-04-05 NOTE — PROGRESS NOTES
Please see the imaging tab for details of the ultrasound performed today.    Michelle Amaya MD  Specialist in Maternal-Fetal Medicine

## 2019-04-08 ENCOUNTER — HOSPITAL ENCOUNTER (OUTPATIENT)
Dept: ULTRASOUND IMAGING | Facility: CLINIC | Age: 34
Discharge: HOME | End: 2019-04-08
Attending: OBSTETRICS & GYNECOLOGY | Admitting: OBSTETRICS & GYNECOLOGY
Payer: COMMERCIAL

## 2019-04-08 ENCOUNTER — OFFICE VISIT (OUTPATIENT)
Dept: MATERNAL FETAL MEDICINE | Facility: CLINIC | Age: 34
End: 2019-04-08
Attending: OBSTETRICS & GYNECOLOGY
Payer: COMMERCIAL

## 2019-04-08 ENCOUNTER — OFFICE VISIT (OUTPATIENT)
Dept: SURGERY | Facility: CLINIC | Age: 34
End: 2019-04-08
Attending: SURGERY
Payer: COMMERCIAL

## 2019-04-08 VITALS
WEIGHT: 180 LBS | SYSTOLIC BLOOD PRESSURE: 125 MMHG | RESPIRATION RATE: 18 BRPM | DIASTOLIC BLOOD PRESSURE: 78 MMHG | HEART RATE: 115 BPM | BODY MASS INDEX: 27.37 KG/M2

## 2019-04-08 DIAGNOSIS — O35.9XX0 FETAL ABNORMALITY IN ANTEPARTUM PREGNANCY, SINGLE OR UNSPECIFIED FETUS: ICD-10-CM

## 2019-04-08 DIAGNOSIS — O35.CXX0 PLEURAL EFFUSION OF FETUS AFFECTING MANAGEMENT OF MOTHER, ANTEPARTUM: ICD-10-CM

## 2019-04-08 DIAGNOSIS — O35.CXX0 PLEURAL EFFUSION, FETAL, AFFECTING CARE OF MOTHER, ANTEPARTUM: Primary | ICD-10-CM

## 2019-04-08 DIAGNOSIS — O35.9XX0 FETAL ABNORMALITY IN ANTEPARTUM PREGNANCY, SINGLE OR UNSPECIFIED FETUS: Primary | ICD-10-CM

## 2019-04-08 DIAGNOSIS — O35.CXX0 PLEURAL EFFUSION, FETAL, AFFECTING CARE OF MOTHER, ANTEPARTUM: ICD-10-CM

## 2019-04-08 LAB
ALT SERPL W P-5'-P-CCNC: 7 U/L (ref 0–50)
ANION GAP SERPL CALCULATED.3IONS-SCNC: 7 MMOL/L (ref 3–14)
AST SERPL W P-5'-P-CCNC: 16 U/L (ref 0–45)
BUN SERPL-MCNC: 4 MG/DL (ref 7–30)
CALCIUM SERPL-MCNC: 9 MG/DL (ref 8.5–10.1)
CHLORIDE SERPL-SCNC: 105 MMOL/L (ref 94–109)
CO2 SERPL-SCNC: 25 MMOL/L (ref 20–32)
CREAT SERPL-MCNC: 0.59 MG/DL (ref 0.52–1.04)
CREAT UR-MCNC: 23 MG/DL
ERYTHROCYTE [DISTWIDTH] IN BLOOD BY AUTOMATED COUNT: 13 % (ref 10–15)
GFR SERPL CREATININE-BSD FRML MDRD: >90 ML/MIN/{1.73_M2}
GLUCOSE SERPL-MCNC: 109 MG/DL (ref 70–99)
HCT VFR BLD AUTO: 40.5 % (ref 35–47)
HGB BLD-MCNC: 13.5 G/DL (ref 11.7–15.7)
MCH RBC QN AUTO: 29.1 PG (ref 26.5–33)
MCHC RBC AUTO-ENTMCNC: 33.3 G/DL (ref 31.5–36.5)
MCV RBC AUTO: 87 FL (ref 78–100)
PLATELET # BLD AUTO: 288 10E9/L (ref 150–450)
POTASSIUM SERPL-SCNC: 3.4 MMOL/L (ref 3.4–5.3)
PROT UR-MCNC: 0.05 G/L
PROT/CREAT 24H UR: 0.24 G/G CR (ref 0–0.2)
RBC # BLD AUTO: 4.64 10E12/L (ref 3.8–5.2)
SODIUM SERPL-SCNC: 137 MMOL/L (ref 133–144)
URATE SERPL-MCNC: 3.8 MG/DL (ref 2.6–6)
WBC # BLD AUTO: 19.3 10E9/L (ref 4–11)

## 2019-04-08 PROCEDURE — 84460 ALANINE AMINO (ALT) (SGPT): CPT | Performed by: OBSTETRICS & GYNECOLOGY

## 2019-04-08 PROCEDURE — 85027 COMPLETE CBC AUTOMATED: CPT | Performed by: OBSTETRICS & GYNECOLOGY

## 2019-04-08 PROCEDURE — 76816 OB US FOLLOW-UP PER FETUS: CPT

## 2019-04-08 PROCEDURE — 76819 FETAL BIOPHYS PROFIL W/O NST: CPT | Performed by: OBSTETRICS & GYNECOLOGY

## 2019-04-08 PROCEDURE — 84156 ASSAY OF PROTEIN URINE: CPT | Performed by: OBSTETRICS & GYNECOLOGY

## 2019-04-08 PROCEDURE — 84450 TRANSFERASE (AST) (SGOT): CPT | Performed by: OBSTETRICS & GYNECOLOGY

## 2019-04-08 PROCEDURE — G0463 HOSPITAL OUTPT CLINIC VISIT: HCPCS | Mod: 25,ZF

## 2019-04-08 PROCEDURE — 84550 ASSAY OF BLOOD/URIC ACID: CPT | Performed by: OBSTETRICS & GYNECOLOGY

## 2019-04-08 PROCEDURE — G0463 HOSPITAL OUTPT CLINIC VISIT: HCPCS | Mod: 27

## 2019-04-08 PROCEDURE — 99202 OFFICE O/P NEW SF 15 MIN: CPT | Mod: ZP | Performed by: SURGERY

## 2019-04-08 PROCEDURE — 36415 COLL VENOUS BLD VENIPUNCTURE: CPT | Performed by: OBSTETRICS & GYNECOLOGY

## 2019-04-08 PROCEDURE — 80048 BASIC METABOLIC PNL TOTAL CA: CPT | Performed by: OBSTETRICS & GYNECOLOGY

## 2019-04-08 RX ORDER — HYDROXYZINE PAMOATE 25 MG/1
25 CAPSULE ORAL
Qty: 30 CAPSULE | Refills: 1 | Status: SHIPPED | OUTPATIENT
Start: 2019-04-08

## 2019-04-08 NOTE — NURSING NOTE
Parul returned to [facility] after peds surgery consult to review labs and BP check. BP WNL. Labs reviewed. Reviewed s/sx of preeclampsia. Pt will  vistaril Rx on 2nd floor pharmacy and return to clinic on Wednesday for US and BP check.      Yoselyn Juarez RN

## 2019-04-08 NOTE — NURSING NOTE
Parul accompanied by her father today. Parul seen in clinic today for follow up ultrasound, BPP, shunt and hydrops check and OB visit at 34w4d gestation due to pregnancy c/b fetal bilateral hydrothorax s/p shunt placement (see report/notes). Pt reports + fetal movement. Wt increase 5 lbs over past week. No obvious signs of edema in lower extremities or face. Hands feel somewhat swollen per pt. Pt did take off wedding ring due tight feeling. BP elevated 134/93, 145/92, pulse 115. Pt denies HA, visual changes, N/V, urinary symptoms/ bldg/lof/change in discharge/chest pain/SOB. C/o occasional right upper quadrant pain, sharp at times, more so when moving around. Also c/o cold symptoms beginning last Friday and Saturday- head congestions, runny/stuffy nose, sore throat. No cough or fever. Parul c/o not being able to fall asleep or stay asleep for very long. Activity continues to be minimal due to bedrest restrictions (from [facility]). Napping occasionally during the day also. Dr. Alcazar met with pt and discussed POC. Plan to drawn preeclampsia labs today, pediatric surgery consult this morning, return to [facility] for BP check and lab review after consult. Map given to out patient lab and [address] [place] [address].      Yoselyn Juarez RN

## 2019-04-08 NOTE — LETTER
2019      RE: Parul Herrera  [address] [place] [address]       2019      Yoselyn Cat MD   Maternal Fetal Medicine   [address]   [place] [address]      RE: Parul Herrera   MRN: [id number]   : 1985      Dear Dr. Cat:      I had the opportunity to see Parul Herrera today at the [facility].  As you will recall, she is a delightful 33-year-old female who I was asked to see in consultation today as her developing child has undergone fetal intervention for congenital hydrothoraces as I understand.  This was done at [facility] and she had bilateral thoracoamniotic shunts placed.  I do not have the records for my review today.  Parul is accompanied by her father to today's visit.  She and her , Alpesh, are expecting a boy.  Estimated gestational age is presently 34-1/2 weeks.  EDC May 2019, but the delivery date has been changed to 2019 presently as she is being closely monitored by our MFM group.      We had a pleasant conversation today.  This is a well-educated family.  We are pleased to see that she is doing well at this point.  By her account, the left shunt has not been working, the right shunt was functional.  The latest ultrasound demonstrates no acute issues otherwise.  We reviewed the latest imaging/child's status during our visit today.  Parul reports that she herself is doing well.  She is having no significant concerns.  She is in good spirits, good health and has no complaints. Her hydration has been good.  No recent illnesses.      PAST MEDICAL HISTORY:  Suggests that she is healthy.  She is currently staying at home with her father.  There is a 2-year-old daughter, Bing, as I understand.      PAST SURGICAL HISTORY:  Unremarkable.     MEDICATIONS:  Prenatal vitamins.      ALLERGIES:  She has no allergies.      PHYSICAL EXAMINATION:  She appears well.  My exam was limited given the nature of our  visit to that of a counseling role.  She is a most pleasant person and in no acute distress.  She appears sufficiently hydrated and nourished.     FAMILY HISTORY:  No bleeding, clotting disorders or issues with Anesthesia.     SOCIAL HISTORY:  She is an  (fifth grade).      IMPRESSION AND PLAN:  It was a pleasure to meet with Parul and her family today in [facility].  She and her father asked very insightful questions.  In general, we spoke about the anticipated  course.  I explained that that would be predicated on the child's condition once he is born; we will closely monitor perinatally and and we can simply follow along.  There is a possibility he will warrant respiratory support, with intubation and ventilation as needed.  If his  clinical course goes well, no intervention at all may be warranted.  We will have to follow progress of the initiation of diet and effusions may need to be drained.  We spoke in broad terms about conservative management, but I remain optimistic that they will do just fine.  With respect to thoracoamniotic shunts, these ought to be visible at the time of delivery.  Sometimes there are retained within the thorax and may accordingly warrant removal at some point electively.  That will be assessed clinically at the time of the child's delivery with our M and Neonatology colleagues.        Thank you for allowing me to participate in her care.  The family remains comfortable with this plan.  I have provided my information and would be gladly available if any concerns arise.  I look forward to a positive report and meeting their son if warranted.       ADDENDUM:  I was pleased to see that Parul delivered successfully on 2019 with her son at 37 weeks.  By report things went well and there were no  complications.  We were not notified of the child's arrival accordingly.  Please do not hesitate to give me a call if  there are additional questions or concerns.          I spent 20 minutes providing care, greater than 50% counseling.      Kind regards,         Ivan Bonilla MD, PhD   Pediatric Surgery  [facility]       cc:     Marce Herrera   [address] [place] [address]      Nanci Alcazar, DO   [facility] Maternal Fetal Medicine   [address], [address]   [place] [address]

## 2019-04-08 NOTE — PROGRESS NOTES
Maternal-Fetal Medicine Prenatal Visit    Parul Herrera MRN# [id number]   Age: 33 year old  Estimated Date of Delivery: May 16, 2019            Gestational age: 34w4d YOB: 1985             Subjective:        Parul isn't feeling well. She has a cold and has a cough.  2 yr old daughter is also sick.  No fever/chills.  +FM.   Intermittent contractions. Denies leaking, bleeding or mucus discharge. Having difficulty sleeping at night.            Objective:        BP (!) 145/92 (BP Location: Left arm, Patient Position: Chair)   Pulse 115   Resp 18   Wt 81.6 kg (180 lb)   LMP 2018   BMI 27.37 kg/m         No results found for this or any previous visit (from the past 24 hour(s)).    Repeat 138/92.        Labs:      Lab Results   Component Value Date    ABO A 2019    RH Pos 2019    AS Neg 2019    HEPBANG Non-Reactive 10/04/2018    HGB 13.5 2019       GBS Status:   Lab Results   Component Value Date    GBS Negative 2019            Gen: NAD, alert, comfortable       Abdomen: Gravid, Soft, Non-tender       Ext: no edema, 2+ reflexes bilaterally          Assessment/Plan:        33 year old y.o.  at 34w4d        1) Fetal moderate left sided hydrothorax.  Shunt in place and visible.  Stable in size.  LV Tei 0.55.  Heart slightly displaced to right hemithorax.         2) Elevated BP today.  Labs normal (WBC 19k), protein <0.3.  Watch for gestational HTN.  Gave preE warning signs.        3) s/p meeting with Dr. Bonilla today       4) Continue three times weekly fetal assessment.  Will do BP each visit and check labs weekly or as needed.  Plan delivery at 37 weeks (IOL) or sooner if pre-hydropic changes, poly with worsening cardiac function or mediastinal shift.        5) Difficulty sleeping.  Rx for vistaril 25mg sent to Pharmacy         Attestation:   The patient was seen for an established outpatient visit.  I spent a total of 15 minutes face to face with Parul Herrera  during today's office visit.  Over (>50%) was spent on counseling the patient and/or coordinating care regarding fetal hydrothorax, elevated BP today, possible gestational HTN.        Nanci Alcazar DO, MD  Maternal-Fetal Medicine  April 8, 2019

## 2019-04-10 ENCOUNTER — OFFICE VISIT (OUTPATIENT)
Dept: MATERNAL FETAL MEDICINE | Facility: CLINIC | Age: 34
End: 2019-04-10
Attending: OBSTETRICS & GYNECOLOGY
Payer: COMMERCIAL

## 2019-04-10 ENCOUNTER — HOSPITAL ENCOUNTER (OUTPATIENT)
Dept: ULTRASOUND IMAGING | Facility: CLINIC | Age: 34
Discharge: HOME | End: 2019-04-10
Attending: OBSTETRICS & GYNECOLOGY | Admitting: OBSTETRICS & GYNECOLOGY
Payer: COMMERCIAL

## 2019-04-10 VITALS — RESPIRATION RATE: 18 BRPM | HEART RATE: 114 BPM | DIASTOLIC BLOOD PRESSURE: 87 MMHG | SYSTOLIC BLOOD PRESSURE: 136 MMHG

## 2019-04-10 DIAGNOSIS — O35.CXX0 PLEURAL EFFUSION OF FETUS AFFECTING MANAGEMENT OF MOTHER, ANTEPARTUM: ICD-10-CM

## 2019-04-10 DIAGNOSIS — O35.CXX0 PLEURAL EFFUSION, FETAL, AFFECTING CARE OF MOTHER, ANTEPARTUM: Primary | ICD-10-CM

## 2019-04-10 PROCEDURE — G0463 HOSPITAL OUTPT CLINIC VISIT: HCPCS | Mod: 25

## 2019-04-10 PROCEDURE — 76816 OB US FOLLOW-UP PER FETUS: CPT

## 2019-04-10 NOTE — NURSING NOTE
Parul seen in clinic today for follow up ultrasound for shunt/hydrops check/BPP and BP check at 34w6d gestation due to fetal hydrothorax (see report/notes). /87, P 115. Pt denies bldg/lof/change in discharge/contractions/headache/vision changes/chest pain/SOB/edema. Reports feeling much better after sleeping with the help of vistaril. Denies fever. Continues to have a productive cough and pulse again high today. Dr. Alcazar into assess patient and discussed POC. Plan to return Friday, cbc with diff ordered. Parul knows to go to lab prior to appt. Pt discharged stable and ambulatory.       Yoselyn Juarez RN

## 2019-04-10 NOTE — PROGRESS NOTES
Maternal-Fetal Medicine Prenatal Visit    Licha Platt MRN# [id number]   Age: 33 year old  Estimated Date of Delivery: May 16, 2019            Gestational age: 34w6d YOB: 1985             Subjective:            Her cold symptoms are much improved.  She has been sleeping a lot better and using 25mg of vistaril at night.  Denies HA or vision changes.  NO RUQ pain or abdominal pain.  + ctx but the same as they always are and not more frequent or painful.  +FM.  No leaking or bleeding.           Objective:        /87 (BP Location: Right arm, Patient Position: Chair)   Pulse 114   Resp 18   LMP 2018          Results for orders placed or performed during the hospital encounter of 04/10/19 (from the past 24 hour(s))   [facility] Comprehensive Single F/U    Narrative            Comp Follow Up  ---------------------------------------------------------------------------------------------------------  Pat. Name: LICHA PLATT       Study Date:  04/10/2019 11:36am  Pat. NO:  [id number]        Referring  MD: STEPHANIE LAM  Site:  [facility]       Sonographer: Edilma Garcia RDMS  :  1985        Age:   33  ---------------------------------------------------------------------------------------------------------    INDICATION  ---------------------------------------------------------------------------------------------------------  Bilateral Pleural Effusions Status Post Right and Left Thoraco-Amniotic Shunt, Status Post Left Thoracocetensis.      METHOD  ---------------------------------------------------------------------------------------------------------  Transabdominal ultrasound examination      PREGNANCY  ---------------------------------------------------------------------------------------------------------  Jenkins pregnancy. Number of fetuses: 1      DATING  ---------------------------------------------------------------------------------------------------------                                            Date                                Details                                                                                      Gest. age                      LATA  LMP                                  8/1/2018                                                                                                                           36 w + 0 d                     5/8/2019  Prior assessment               10/4/2018                         GA: 8 w + 0 d                                                                            34 w + 6 d                     5/16/2019  Assigned dating                  Dating performed on 04/1/2019, based on the prior assessment (on 10/4/2018)                      34 w + 6 d                     5/16/2019      GENERAL EVALUATION  ---------------------------------------------------------------------------------------------------------  Cardiac activity present.  bpm.  Fetal movements visualized.  Presentation cephalic.  Placenta anterior.  Umbilical cord previously studied.  Amniotic fluid MVP 9.6 cm. NAZ 24.5 cm. Q1 9.6 cm, Q2 4.2 cm, Q3 4.1 cm, Q4 6.6 cm.      FETAL ANATOMY  ---------------------------------------------------------------------------------------------------------  Heart / Thorax                      Left lung: pleural effusion    Gender: male.  No evidence of fetal hydrops seen on today's exam.      BIOPHYSICAL PROFILE  ---------------------------------------------------------------------------------------------------------  2: Fetal breathing movements  2: Gross body movements  2: Fetal tone  2: Amniotic fluid volume  8/8 Biophysical profile score      RECOMMENDATION  ---------------------------------------------------------------------------------------------------------  We discussed the findings on today's ultrasound with the patient.    See Epic for further details on today's visit.    Continue three times weekly fetal assessment.    Thank you  for the opportunity to participate in the care of this patient. If you have questions regarding today's evaluation or if we can be of further service, please contact the  M[facility]Fetal Medicine Center.    **Fetal anomalies may be present but not detected**        Impression    IMPRESSION  ---------------------------------------------------------------------------------------------------------  1) single intrauterine pregnancy at 34 6/7 weeks.  2) Moderate sized left pleural effusion with thoracoamniotic shunt in place. No right pleural effusion noted. Minimal cardiac displacement to the right. LV Tei is 0.62 (stable).    3) No fetal hydrops.  4) Mild polyhydramnios.  5) BPP is reassuring.           Labs:      Lab Results   Component Value Date    ABO A 2019    RH Pos 2019    AS Neg 2019    HEPBANG Non-Reactive 10/04/2018    HGB 13.5 2019       GBS Status:   Lab Results   Component Value Date    GBS Negative 2019                  Gen: NAD, alert, comfortable        Abdomen: Gravid, Soft, Non-tender to palpation of fundus/abdomen       Ext: no edema    See lab results from earlier this week.  On Monday her BP was elevated in the mild range.  Labs unremarkable expect WBC count 19k.  Plan repeat CBC w/ dif on Friday prior to visit.           Assessment:        33 year old y.o.  at 34w6d        1) Fetal hydrothorax (now residual moderate left sided effusion).  Bilateral thoracoamniotic shunts in place.  If findings remain stable plan for IOL at 37 weeks.        2) s/p 2 courses of BMZ.  GBS negative.        3) Possible gestational HTN.  Continue to observe closely.  Elevated WBC count with hx of several invasive procedures.  No physical signs/symptoms of intramniotic infection at this time.           Attestation:   The patient was seen for an established outpatient visit.  I spent a total of 15 minutes face to face with Parul Herrera during today's office visit.  Over (>50%) was spent  on counseling the patient and/or coordinating care regarding fetal hydrothorax.        Nanci Alcazar, DO  Maternal-Fetal Medicine  April 10, 2019

## 2019-04-12 ENCOUNTER — OFFICE VISIT (OUTPATIENT)
Dept: MATERNAL FETAL MEDICINE | Facility: CLINIC | Age: 34
End: 2019-04-12
Attending: OBSTETRICS & GYNECOLOGY
Payer: COMMERCIAL

## 2019-04-12 ENCOUNTER — HOSPITAL ENCOUNTER (OUTPATIENT)
Dept: ULTRASOUND IMAGING | Facility: CLINIC | Age: 34
Discharge: HOME | End: 2019-04-12
Attending: OBSTETRICS & GYNECOLOGY | Admitting: OBSTETRICS & GYNECOLOGY
Payer: COMMERCIAL

## 2019-04-12 DIAGNOSIS — O35.CXX0 PLEURAL EFFUSION OF FETUS AFFECTING MANAGEMENT OF MOTHER, ANTEPARTUM: ICD-10-CM

## 2019-04-12 DIAGNOSIS — O35.CXX0 PLEURAL EFFUSION OF FETUS AFFECTING MANAGEMENT OF MOTHER, ANTEPARTUM: Primary | ICD-10-CM

## 2019-04-12 PROCEDURE — 76816 OB US FOLLOW-UP PER FETUS: CPT

## 2019-04-12 PROCEDURE — 76819 FETAL BIOPHYS PROFIL W/O NST: CPT | Performed by: OBSTETRICS & GYNECOLOGY

## 2019-04-12 NOTE — PROGRESS NOTES
"Please see \"Imaging\" tab under \"Chart Review\" for details of today's US at the [facility].    Zion Sullivan MD  Maternal-Fetal Medicine      "

## 2019-04-15 ENCOUNTER — HOSPITAL ENCOUNTER (OUTPATIENT)
Dept: ULTRASOUND IMAGING | Facility: CLINIC | Age: 34
Discharge: HOME | End: 2019-04-15
Attending: OBSTETRICS & GYNECOLOGY | Admitting: OBSTETRICS & GYNECOLOGY
Payer: COMMERCIAL

## 2019-04-15 ENCOUNTER — OFFICE VISIT (OUTPATIENT)
Dept: MATERNAL FETAL MEDICINE | Facility: CLINIC | Age: 34
End: 2019-04-15
Attending: OBSTETRICS & GYNECOLOGY
Payer: COMMERCIAL

## 2019-04-15 VITALS
SYSTOLIC BLOOD PRESSURE: 139 MMHG | HEART RATE: 101 BPM | BODY MASS INDEX: 27.81 KG/M2 | WEIGHT: 182.9 LBS | RESPIRATION RATE: 18 BRPM | DIASTOLIC BLOOD PRESSURE: 88 MMHG

## 2019-04-15 DIAGNOSIS — O35.CXX0 PLEURAL EFFUSION, FETAL, AFFECTING CARE OF MOTHER, ANTEPARTUM: ICD-10-CM

## 2019-04-15 PROCEDURE — 76816 OB US FOLLOW-UP PER FETUS: CPT

## 2019-04-15 PROCEDURE — 76819 FETAL BIOPHYS PROFIL W/O NST: CPT | Performed by: OBSTETRICS & GYNECOLOGY

## 2019-04-15 PROCEDURE — G0463 HOSPITAL OUTPT CLINIC VISIT: HCPCS | Mod: 25,ZF

## 2019-04-15 NOTE — NURSING NOTE
Parul seen in clinic today for follow up growth ultrasound, BPP, shunt and hydrops check, and OB visit at 35w4d gestation due to fetal pleural effusions s/p shunt placement (see report/notes). VSS. Pt denies bldg/lof/change in discharge/contractions/headache/vision changes/chest pain/SOB/edema. Sleep has improved with occasional use of vistaril and increased daily activity. Dr. Alcazar met with pt and discussed POC. Plan to continue surveillance 3x/week.  IOL scheduled for 4/25/19 at 37w0d. L&D and NICU notified. Continue to monitor for s/sx of labor and preeclampsia. Pt discharged stable and ambulatory.       Yoselyn Juarez RN

## 2019-04-15 NOTE — PROGRESS NOTES
Maternal-Fetal Medicine Prenatal Visit    Licha Platt MRN# [id number]   Age: 33 year old  Estimated Date of Delivery: May 16, 2019            Gestational age: 35w4d YOB: 1985             Subjective:       Patient is still having daily contractions, but not yet very painful or with a regular pattern.  +FM.  No leaking or bleeding.  Still has a cold/cough symptoms.  Denies fever or chest pain.  No HA, visual changes or RUQ pain.               Objective:        /88 (BP Location: Left arm)   Pulse 101   Resp 18   Wt 83 kg (182 lb 14.4 oz)   LMP 2018   BMI 27.81 kg/m           Results for orders placed or performed during the hospital encounter of 04/15/19 (from the past 24 hour(s))   [facility] Comprehensive Single F/U    Narrative            Comp Follow Up  ---------------------------------------------------------------------------------------------------------  Pat. Name: LICHA PLATT       Study Date:  04/15/2019 9:33am  Pat. NO:  [id number]        Referring  MD: STEPHANIE LAM  Site:  [facility]       Sonographer: Milana Del Toro RDMS  :  1985        Age:   33  ---------------------------------------------------------------------------------------------------------    INDICATION  ---------------------------------------------------------------------------------------------------------  Bilateral Pleural Effusions Status Post Right and Left Thoraco-Amniotic Shunt, Status Post Left Thoracocetensis.      METHOD  ---------------------------------------------------------------------------------------------------------  Transabdominal ultrasound examination. View: Sufficient      PREGNANCY  ---------------------------------------------------------------------------------------------------------  Jenkins pregnancy. Number of fetuses: 1      DATING  ---------------------------------------------------------------------------------------------------------                                            Date                                Details                                                                                      Gest. age                      LATA  LMP                                  8/1/2018                                                                                                                           36 w + 5 d                     5/8/2019  Prior assessment               10/4/2018                         GA: 8 w + 0 d                                                                            35 w + 4 d                     5/16/2019  U/S                                   4/15/2019                         based upon AC, BPD, Femur, HC                                                36 w + 4 d                     5/9/2019  Assigned dating                  Dating performed on 04/1/2019, based on the prior assessment (on 10/4/2018)                      35 w + 4 d                     5/16/2019      GENERAL EVALUATION  ---------------------------------------------------------------------------------------------------------  Cardiac activity present.  bpm.  Fetal movements visualized.  Presentation cephalic.  Placenta anterior.  Umbilical cord 3 vessel cord.  Amniotic fluid Amount of AF: normal amount. MVP 7.6 cm.      FETAL BIOMETRY  ---------------------------------------------------------------------------------------------------------  Main Fetal Biometry:  BPD                                        91.4                    mm                         37w 1d                Hadlock  OFD                                        115.6                  mm                          36w 4d                Nicolaides  HC                                          332.6                  mm                          38w 0d                Hadlock  AC                                          324.2                  mm                          36w 2d                Hadlock  Femur                                       67.3                   mm                          34w 4d                Hadlock  Humerus                                  62.8                    mm                         36w 3d                Danielle  Fetal Weight Calculation:  EFW                                       2,862                  g                                     58%         Chadwick  EFW (lb,oz)                             6 lb 5                  oz  EFW by                                        Li (BPD-HC-AC-FL)      FETAL ANATOMY  ---------------------------------------------------------------------------------------------------------  The following structures appear abnormal:  Heart / Thorax                      Left lung: pleural effusion.    The following structures appear normal:  Head / Neck                         Cranium. Head size. Head shape. Midline falx. Cavum septi pellucidi. Thalami.  Face                                   Profile.  Heart / Thorax                      4-chamber view. RVOT view. LVOT view.  Abdomen                             Stomach: Stomach size and situs appear normal. Kidneys. Bladder: Bladder appears normal in size and shape.  Spine                                  Cervical spine. Thoracic spine. Lumbar spine. Sacral spine.    The following structures were documented previously:  Head / Neck                         Lateral ventricles. Cisterna magna.  Heart / Thorax                      Aortic arch view. Ductal arch view.  Abdomen                             Abdominal wall.    Gender: male.  No evidence of fetal hydrops seen on today's exam.      BIOPHYSICAL PROFILE  ---------------------------------------------------------------------------------------------------------  2: Fetal breathing movements  2: Gross body movements  2: Fetal tone  2: Amniotic fluid volume  8/8 Biophysical profile score      MATERNAL  STRUCTURES  ---------------------------------------------------------------------------------------------------------  Cervix                                  Not examined  Right Ovary                          Not examined  Left Ovary                            Not examined      RECOMMENDATION  ---------------------------------------------------------------------------------------------------------  We discussed the findings on today's ultrasound with the patient.    See Epic notes for further details.    Continue three times weekly surveillance.    Thank you for the opportunity to participate in the care of this patient. If you have questions regarding today's evaluation or if we can be of further service, please contact the  Maternal-Fetal Medicine Center.    **Fetal anomalies may be present but not detected**        Impression    IMPRESSION  ---------------------------------------------------------------------------------------------------------  1) Intrauterine pregnancy at 35 4/7 weeks gestational age.  2) Moderate left sided pleural effusion (most fluid is seen inferiorly near diaphragm). Minimal right sided effusion. There is no mediastinal shifting. The remainder of the  visualized fetal anatomy appears normal. The shunts are difficult to see given advanced gestational age and fetal position.  3) Growth parameters and estimated fetal weight were consistent with an appropriate for gestation age pattern of growth.  4) The amniotic fluid volume appeared normal.  5) The BPP is reassuring.           Labs:      Lab Results   Component Value Date    ABO A 03/26/2019    RH Pos 03/26/2019    AS Neg 03/26/2019    HEPBANG Non-Reactive 10/04/2018    HGB 12.8 04/12/2019       GBS Status:   Lab Results   Component Value Date    GBS Negative 03/26/2019              Gen: NAD, alert comfortable       Abdomen: Gravid, Soft, Non-tender       Ext: no edema, no calf tenderness          Assessment/Plan:        33 year old y.o.   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  Persistent left sided pleural effusion requiring drainage last at 32 weeks.  Now minimal to moderate inferior effusion.  Continue three times weekly surveillance.        2) s/p 2 courses of BMZ.  GBS negative.        3) BP higher than baseline, but does not yet meet criteria for GHTN.        4) Planning 37 week IOL on 19 due to persistent pleural effusions.  Shunts  Will need to be grasped and pulled ASAP after delivery.  If they are retained in the chest she has met with Dr. Bonilla regarding eventual removal.  NICU to be present at delivery to assess respiratory status, possible need for chest tubes.  Baby to go to NICU initially for CXR, etc.              Attestation:   The patient was seen for an established outpatient visit.  I spent a total of 15 minutes face to face with Parul Herrera during today's office visit.  Over (>50%) was spent on counseling the patient and/or coordinating care regarding pleural effusions.        Nanci Alcazar DO, MD  Maternal-Fetal Medicine  April 15, 2019

## 2019-04-17 ENCOUNTER — OFFICE VISIT (OUTPATIENT)
Dept: MATERNAL FETAL MEDICINE | Facility: CLINIC | Age: 34
End: 2019-04-17
Attending: OBSTETRICS & GYNECOLOGY
Payer: COMMERCIAL

## 2019-04-17 ENCOUNTER — HOSPITAL ENCOUNTER (OUTPATIENT)
Dept: ULTRASOUND IMAGING | Facility: CLINIC | Age: 34
Discharge: HOME | End: 2019-04-17
Attending: OBSTETRICS & GYNECOLOGY | Admitting: OBSTETRICS & GYNECOLOGY
Payer: COMMERCIAL

## 2019-04-17 VITALS — OXYGEN SATURATION: 97 % | SYSTOLIC BLOOD PRESSURE: 133 MMHG | DIASTOLIC BLOOD PRESSURE: 82 MMHG | HEART RATE: 110 BPM

## 2019-04-17 DIAGNOSIS — O35.CXX0 PLEURAL EFFUSION, FETAL, AFFECTING CARE OF MOTHER, ANTEPARTUM: ICD-10-CM

## 2019-04-17 DIAGNOSIS — O35.9XX0 FETAL ABNORMALITY IN ANTEPARTUM PREGNANCY, SINGLE OR UNSPECIFIED FETUS: Primary | ICD-10-CM

## 2019-04-17 LAB
ALT SERPL W P-5'-P-CCNC: 12 U/L (ref 0–50)
ANION GAP SERPL CALCULATED.3IONS-SCNC: 8 MMOL/L (ref 3–14)
AST SERPL W P-5'-P-CCNC: 18 U/L (ref 0–45)
BUN SERPL-MCNC: 5 MG/DL (ref 7–30)
CALCIUM SERPL-MCNC: 9 MG/DL (ref 8.5–10.1)
CHLORIDE SERPL-SCNC: 106 MMOL/L (ref 94–109)
CO2 SERPL-SCNC: 25 MMOL/L (ref 20–32)
CREAT SERPL-MCNC: 0.63 MG/DL (ref 0.52–1.04)
CREAT UR-MCNC: 26 MG/DL
ERYTHROCYTE [DISTWIDTH] IN BLOOD BY AUTOMATED COUNT: 12.8 % (ref 10–15)
GFR SERPL CREATININE-BSD FRML MDRD: >90 ML/MIN/{1.73_M2}
GLUCOSE SERPL-MCNC: 91 MG/DL (ref 70–99)
HCT VFR BLD AUTO: 39 % (ref 35–47)
HGB BLD-MCNC: 12.9 G/DL (ref 11.7–15.7)
MCH RBC QN AUTO: 28.6 PG (ref 26.5–33)
MCHC RBC AUTO-ENTMCNC: 33.1 G/DL (ref 31.5–36.5)
MCV RBC AUTO: 87 FL (ref 78–100)
PLATELET # BLD AUTO: 335 10E9/L (ref 150–450)
POTASSIUM SERPL-SCNC: 3.5 MMOL/L (ref 3.4–5.3)
PROT UR-MCNC: <0.05 G/L
PROT/CREAT 24H UR: NORMAL G/G CR (ref 0–0.2)
RBC # BLD AUTO: 4.51 10E12/L (ref 3.8–5.2)
SODIUM SERPL-SCNC: 139 MMOL/L (ref 133–144)
WBC # BLD AUTO: 16.1 10E9/L (ref 4–11)

## 2019-04-17 PROCEDURE — 84450 TRANSFERASE (AST) (SGOT): CPT | Performed by: OBSTETRICS & GYNECOLOGY

## 2019-04-17 PROCEDURE — 85027 COMPLETE CBC AUTOMATED: CPT | Performed by: OBSTETRICS & GYNECOLOGY

## 2019-04-17 PROCEDURE — 84156 ASSAY OF PROTEIN URINE: CPT | Performed by: OBSTETRICS & GYNECOLOGY

## 2019-04-17 PROCEDURE — 76819 FETAL BIOPHYS PROFIL W/O NST: CPT | Performed by: OBSTETRICS & GYNECOLOGY

## 2019-04-17 PROCEDURE — 80048 BASIC METABOLIC PNL TOTAL CA: CPT | Performed by: OBSTETRICS & GYNECOLOGY

## 2019-04-17 PROCEDURE — 36415 COLL VENOUS BLD VENIPUNCTURE: CPT | Performed by: OBSTETRICS & GYNECOLOGY

## 2019-04-17 PROCEDURE — 84460 ALANINE AMINO (ALT) (SGPT): CPT | Performed by: OBSTETRICS & GYNECOLOGY

## 2019-04-17 PROCEDURE — 76816 OB US FOLLOW-UP PER FETUS: CPT

## 2019-04-17 NOTE — NURSING NOTE
Parul seen in clinic today for follow up ultrasound, BPP, hydrops and shunt check and BP check at 35w6d gestation due to pregnancy c/b fetal pleural effusions s/p thoracocentesis and shunt placement (see report/notes). BP today 144/85, 133/82, P 107-110, sats 97% RA. Pt continues to have a productive cough, no fever, or SOB/CP. Pt denies bldg/lof/change in discharge/contractions/headache/vision changes/chest pain/SOB/edema. Dr. Cat met with pt and discussed POC. Plan to have preeclampsia labs drawn today and follow up on Friday as scheduled. Pt discharged stable and ambulatory.       Yoselyn Juarez RN

## 2019-04-17 NOTE — PROGRESS NOTES
Parul now meets criteria for gestational hypertension with a mild range blood pressure today.  She denies headache, blurry vision and scotomata.  We will repeat labs today and continue three times weekly ultrasound and BP checks.  Delivery planned at 37 weeks.      Please see ultrasound report under imaging tab for details on ultrasound performed today.    Yoselyn Cat MD  , OB/GYN  Maternal-Fetal Medicine  [email or web address]  [phone] (Academic office)  [phone] (Pager)

## 2019-04-19 ENCOUNTER — OFFICE VISIT (OUTPATIENT)
Dept: MATERNAL FETAL MEDICINE | Facility: CLINIC | Age: 34
End: 2019-04-19
Attending: OBSTETRICS & GYNECOLOGY
Payer: COMMERCIAL

## 2019-04-19 ENCOUNTER — HOSPITAL ENCOUNTER (OUTPATIENT)
Dept: ULTRASOUND IMAGING | Facility: CLINIC | Age: 34
Discharge: HOME | End: 2019-04-19
Attending: OBSTETRICS & GYNECOLOGY | Admitting: OBSTETRICS & GYNECOLOGY
Payer: COMMERCIAL

## 2019-04-19 VITALS — SYSTOLIC BLOOD PRESSURE: 113 MMHG | DIASTOLIC BLOOD PRESSURE: 78 MMHG

## 2019-04-19 DIAGNOSIS — O35.CXX0 PLEURAL EFFUSION, FETAL, AFFECTING CARE OF MOTHER, ANTEPARTUM: ICD-10-CM

## 2019-04-19 DIAGNOSIS — O35.CXX0 PLEURAL EFFUSION OF FETUS AFFECTING MANAGEMENT OF MOTHER, ANTEPARTUM: Primary | ICD-10-CM

## 2019-04-19 PROCEDURE — 76819 FETAL BIOPHYS PROFIL W/O NST: CPT | Performed by: OBSTETRICS & GYNECOLOGY

## 2019-04-19 PROCEDURE — 76816 OB US FOLLOW-UP PER FETUS: CPT

## 2019-04-19 NOTE — PROGRESS NOTES
"Please see \"Imaging\" tab under \"Chart Review\" for details of today's US at the [facility].    Zion Sullivan MD  Maternal-Fetal Medicine      "

## 2019-04-22 ENCOUNTER — HOSPITAL ENCOUNTER (OUTPATIENT)
Dept: ULTRASOUND IMAGING | Facility: CLINIC | Age: 34
Discharge: HOME | End: 2019-04-22
Attending: OBSTETRICS & GYNECOLOGY | Admitting: OBSTETRICS & GYNECOLOGY
Payer: COMMERCIAL

## 2019-04-22 ENCOUNTER — OFFICE VISIT (OUTPATIENT)
Dept: MATERNAL FETAL MEDICINE | Facility: CLINIC | Age: 34
End: 2019-04-22
Attending: OBSTETRICS & GYNECOLOGY
Payer: COMMERCIAL

## 2019-04-22 VITALS
HEART RATE: 95 BPM | BODY MASS INDEX: 28.3 KG/M2 | RESPIRATION RATE: 18 BRPM | SYSTOLIC BLOOD PRESSURE: 131 MMHG | WEIGHT: 186.1 LBS | DIASTOLIC BLOOD PRESSURE: 80 MMHG

## 2019-04-22 DIAGNOSIS — O35.CXX0 PLEURAL EFFUSION, FETAL, AFFECTING CARE OF MOTHER, ANTEPARTUM: Primary | ICD-10-CM

## 2019-04-22 DIAGNOSIS — O35.CXX0 PLEURAL EFFUSION, FETAL, AFFECTING CARE OF MOTHER, ANTEPARTUM: ICD-10-CM

## 2019-04-22 PROCEDURE — G0463 HOSPITAL OUTPT CLINIC VISIT: HCPCS | Mod: 25,ZF

## 2019-04-22 PROCEDURE — 76819 FETAL BIOPHYS PROFIL W/O NST: CPT | Performed by: OBSTETRICS & GYNECOLOGY

## 2019-04-22 PROCEDURE — 76816 OB US FOLLOW-UP PER FETUS: CPT

## 2019-04-22 ASSESSMENT — PATIENT HEALTH QUESTIONNAIRE - PHQ9: SUM OF ALL RESPONSES TO PHQ QUESTIONS 1-9: 4

## 2019-04-22 NOTE — PROGRESS NOTES
Maternal-Fetal Medicine Prenatal Visit    Licha Platt MRN# [id number]   Age: 33 year old  Estimated Date of Delivery: May 16, 2019            Gestational age: 36w4d YOB: 1985             Subjective:        Pt doing well.  Still has a slight lingering cough from her cold, but no fever/chills.  +FM.  Feels like her hands are swollen, but overall feels well.  Denies HA, visual changes.  Had some upper abd pain after walking a lot this weekend, resolved with rest.     Having contractions, but nothing too regular.  No bleeding or leaking.           Objective:        /80 (BP Location: Left arm, Patient Position: Chair)   Pulse 95   Resp 18   Wt 84.4 kg (186 lb 1.6 oz)   LMP 2018   BMI 28.30 kg/m           Results for orders placed or performed during the hospital encounter of 19 (from the past 24 hour(s))   [facility] US Comprehensive Single F/U    Narrative            Comp Follow Up  ---------------------------------------------------------------------------------------------------------  Pat. Name: LICHA PLATT       Study Date:  2019 8:43am  Pat. NO:  [id number]        Referring  MD: STEPHANIE LAM  Site:  [facility]       Sonographer: Florence Vicente RDMS  :  1985        Age:   33  ---------------------------------------------------------------------------------------------------------    INDICATION  ---------------------------------------------------------------------------------------------------------  Bilateral Pleural Effusions Status Post Right and Left Thoraco-Amniotic Shunt, Status Post Left Thoracocetensis.      PREGNANCY  ---------------------------------------------------------------------------------------------------------  Jenkins pregnancy. Number of fetuses: 1      DATING  ---------------------------------------------------------------------------------------------------------                                           Date                                 Details                                                                                      Gest. age                      LATA  LMP                                  8/1/2018                                                                                                                           37 w + 5 d                     5/8/2019  Prior assessment               10/4/2018                         GA: 8 w + 0 d                                                                            36 w + 4 d                     5/16/2019  Assigned dating                  Dating performed on 04/19/2019, based on the prior assessment (on 10/4/2018)                    36 w + 4 d                     5/16/2019      GENERAL EVALUATION  ---------------------------------------------------------------------------------------------------------  Cardiac activity present.  bpm.  Fetal movements visualized.  Presentation cephalic.  Placenta Placental site: anterior.  Umbilical cord Cord vessels: previously documented.  Amniotic fluid MVP 7.4 cm.      FETAL ANATOMY  ---------------------------------------------------------------------------------------------------------  Gender: male.      BIOPHYSICAL PROFILE  ---------------------------------------------------------------------------------------------------------  2: Fetal breathing movements  2: Gross body movements  2: Fetal tone  2: Amniotic fluid volume  8/8 Biophysical profile score  Interpretation: normal      RECOMMENDATION  ---------------------------------------------------------------------------------------------------------  We discussed the findings on today's ultrasound with the patient.    Continue close three times weekly monitoring. Delivery scheduled at 37 weeks.    See Epic notes for further details of today's visit.    Thank you for the opportunity to participate in the care of this patient. If you have questions regarding today's evaluation or if we can be of  further service, please contact the  [facility].    **Fetal anomalies may be present but not detected**        Impression    IMPRESSION  ---------------------------------------------------------------------------------------------------------  1) Intrauterine pregnancy at 36 4/7 weeks gestational age.  2) Left pleural effusion moderately increased compared to last evaluation and mild right pleural effusions (left>right). Both shunts appear in place. A small heterogenous area  is noted above right diaphragm, likely small clot.  3) There is no evidence of fetal hydrops.  4) The amniotic fluid volume appeared normal.  5) BPP 8/8           Labs:      Lab Results   Component Value Date    ABO A 2019    RH Pos 2019    AS Neg 2019    HEPBANG Non-Reactive 10/04/2018    HGB 12.9 2019       GBS Status:   Lab Results   Component Value Date    GBS Negative 2019                Gen: NAD, alert, comfortable       Abdomen: Gravid, Soft, Non-tender       Ext: trace edema          Assessment/Plan:        33 year old y.o.  at 36w4d        1) Fetal bilateral pleural effusions since 24 weeks s/p thoracoamniotic shunts on both sides.  Left hydrothorax persistent since 32 weeks mild to moderate.  Had thoracocentesis around that time.  S/p Peds Surg consultation.       2) Continue three times weekly fetal assessment.  IOL scheduled at 37 weeks on .  SHUNTS WILL NEED TO BE REMOVED IMMEDIATELY.  Avoid stimulation of baby as cry, etc can cause shunts to be internalized into pleural cavity.         3) Gestational HTN- normal BP today.  Labs last week wnl.  Plan to check them again on Wed along with T&S in preparation for IOL.              Attestation:   The patient was seen for an established outpatient visit.  I spent a total of 15 minutes face to face with Parul Herrera during today's office visit.  Over (>50%) was spent on counseling the patient and/or coordinating care regarding  bilateral pleural effusions s/p shunts on both sides.  Moderate left sided effusion.  Minimal right sided effusion.        Nanci Alcazar DO, MD  Maternal-Fetal Medicine  April 22, 2019

## 2019-04-22 NOTE — NURSING NOTE
Parul seen in clinic today for follow up ultrasound, hydrops/shunt check/BPP, and OB visit at 36w4dw gestation due to pregnancy c/b bilateral fetal pleural effusions s/p shunt placement (see report/notes). VSS. Pt reports + fetal movement. Pt denies bldg/lof/change in discharge/headache/vision changes/chest pain/SOB/edema. Continues to have irregular contractions at times. Cough slowly improving. No fevers. Sleeping ok without vistaril. Dr. Alcazar met with pt and discussed POC. Plan to return on Wednesday for follow up US, BP, preeclampsia labs and T&S.  IOL Thursday 4/25 0800. Pt discharged stable and ambulatory.       Yoselyn Juarez RN

## 2019-04-24 ENCOUNTER — OFFICE VISIT (OUTPATIENT)
Dept: MATERNAL FETAL MEDICINE | Facility: CLINIC | Age: 34
End: 2019-04-24
Attending: OBSTETRICS & GYNECOLOGY
Payer: COMMERCIAL

## 2019-04-24 ENCOUNTER — HOSPITAL ENCOUNTER (OUTPATIENT)
Dept: ULTRASOUND IMAGING | Facility: CLINIC | Age: 34
Discharge: HOME | End: 2019-04-24
Attending: OBSTETRICS & GYNECOLOGY | Admitting: OBSTETRICS & GYNECOLOGY
Payer: COMMERCIAL

## 2019-04-24 VITALS — SYSTOLIC BLOOD PRESSURE: 135 MMHG | HEART RATE: 112 BPM | DIASTOLIC BLOOD PRESSURE: 85 MMHG

## 2019-04-24 DIAGNOSIS — O35.CXX0 PLEURAL EFFUSION OF FETUS AFFECTING MANAGEMENT OF MOTHER, ANTEPARTUM: Primary | ICD-10-CM

## 2019-04-24 DIAGNOSIS — O26.90 PREGNANCY RELATED CONDITION, ANTEPARTUM: ICD-10-CM

## 2019-04-24 DIAGNOSIS — O35.CXX0 PLEURAL EFFUSION, FETAL, AFFECTING CARE OF MOTHER, ANTEPARTUM: ICD-10-CM

## 2019-04-24 LAB
ABO + RH BLD: NORMAL
ABO + RH BLD: NORMAL
ALT SERPL W P-5'-P-CCNC: 11 U/L (ref 0–50)
ANION GAP SERPL CALCULATED.3IONS-SCNC: 8 MMOL/L (ref 3–14)
AST SERPL W P-5'-P-CCNC: 23 U/L (ref 0–45)
BLD GP AB SCN SERPL QL: NORMAL
BLOOD BANK CMNT PATIENT-IMP: NORMAL
BUN SERPL-MCNC: 6 MG/DL (ref 7–30)
CALCIUM SERPL-MCNC: 8.4 MG/DL (ref 8.5–10.1)
CHLORIDE SERPL-SCNC: 108 MMOL/L (ref 94–109)
CO2 SERPL-SCNC: 22 MMOL/L (ref 20–32)
CREAT SERPL-MCNC: 0.57 MG/DL (ref 0.52–1.04)
CREAT UR-MCNC: 43 MG/DL
ERYTHROCYTE [DISTWIDTH] IN BLOOD BY AUTOMATED COUNT: 13.1 % (ref 10–15)
GFR SERPL CREATININE-BSD FRML MDRD: >90 ML/MIN/{1.73_M2}
GLUCOSE SERPL-MCNC: 101 MG/DL (ref 70–99)
HCT VFR BLD AUTO: 40.1 % (ref 35–47)
HGB BLD-MCNC: 13.3 G/DL (ref 11.7–15.7)
MCH RBC QN AUTO: 28.8 PG (ref 26.5–33)
MCHC RBC AUTO-ENTMCNC: 33.2 G/DL (ref 31.5–36.5)
MCV RBC AUTO: 87 FL (ref 78–100)
PLATELET # BLD AUTO: 299 10E9/L (ref 150–450)
POTASSIUM SERPL-SCNC: 3.7 MMOL/L (ref 3.4–5.3)
PROT UR-MCNC: 0.06 G/L
PROT/CREAT 24H UR: 0.13 G/G CR (ref 0–0.2)
RBC # BLD AUTO: 4.62 10E12/L (ref 3.8–5.2)
SODIUM SERPL-SCNC: 138 MMOL/L (ref 133–144)
SPECIMEN EXP DATE BLD: NORMAL
URATE SERPL-MCNC: 4.8 MG/DL (ref 2.6–6)
WBC # BLD AUTO: 13.3 10E9/L (ref 4–11)

## 2019-04-24 PROCEDURE — 84156 ASSAY OF PROTEIN URINE: CPT | Performed by: OBSTETRICS & GYNECOLOGY

## 2019-04-24 PROCEDURE — 84450 TRANSFERASE (AST) (SGOT): CPT | Performed by: OBSTETRICS & GYNECOLOGY

## 2019-04-24 PROCEDURE — 84460 ALANINE AMINO (ALT) (SGPT): CPT | Performed by: OBSTETRICS & GYNECOLOGY

## 2019-04-24 PROCEDURE — 84550 ASSAY OF BLOOD/URIC ACID: CPT | Performed by: OBSTETRICS & GYNECOLOGY

## 2019-04-24 PROCEDURE — 86901 BLOOD TYPING SEROLOGIC RH(D): CPT | Performed by: OBSTETRICS & GYNECOLOGY

## 2019-04-24 PROCEDURE — 36415 COLL VENOUS BLD VENIPUNCTURE: CPT | Performed by: OBSTETRICS & GYNECOLOGY

## 2019-04-24 PROCEDURE — 86900 BLOOD TYPING SEROLOGIC ABO: CPT | Performed by: OBSTETRICS & GYNECOLOGY

## 2019-04-24 PROCEDURE — 76816 OB US FOLLOW-UP PER FETUS: CPT

## 2019-04-24 PROCEDURE — 80048 BASIC METABOLIC PNL TOTAL CA: CPT | Performed by: OBSTETRICS & GYNECOLOGY

## 2019-04-24 PROCEDURE — 86850 RBC ANTIBODY SCREEN: CPT | Performed by: OBSTETRICS & GYNECOLOGY

## 2019-04-24 PROCEDURE — 85027 COMPLETE CBC AUTOMATED: CPT | Performed by: OBSTETRICS & GYNECOLOGY

## 2019-04-24 PROCEDURE — 76819 FETAL BIOPHYS PROFIL W/O NST: CPT | Performed by: OBSTETRICS & GYNECOLOGY

## 2019-04-24 NOTE — NURSING NOTE
Parul seen in clinic today for follow up ultrasound for hydrops check and shunt check, bpp, BP check at 36w6d gestation due to pregnancy c/b bilateral pleural effusions (see report/notes). /85. Pt denies bldg/lof/change in discharge/contractions/headache/vision changes/chest pain/SOB/edema. Dr. Alcazar met with pt and discussed POC. Plan IOL tomorrow 4/25 at 0800. Pt given instructions to call 30-60 min prior to induction time of 0800 and parking information. Pt discharged stable and ambulatory.       Yoselyn Juarez RN

## 2019-04-25 ENCOUNTER — HOSPITAL ENCOUNTER (INPATIENT)
Facility: CLINIC | Age: 34
LOS: 3 days | Discharge: HOME | End: 2019-04-28
Attending: OBSTETRICS & GYNECOLOGY | Admitting: OBSTETRICS & GYNECOLOGY
Payer: COMMERCIAL

## 2019-04-25 LAB
HGB BLD-MCNC: 12.6 G/DL (ref 11.7–15.7)
PLATELET # BLD AUTO: 286 10E9/L (ref 150–450)
T PALLIDUM AB SER QL: NONREACTIVE

## 2019-04-25 PROCEDURE — 85018 HEMOGLOBIN: CPT | Performed by: STUDENT IN AN ORGANIZED HEALTH CARE EDUCATION/TRAINING PROGRAM

## 2019-04-25 PROCEDURE — 25000132 ZZH RX MED GY IP 250 OP 250 PS 637: Performed by: OBSTETRICS & GYNECOLOGY

## 2019-04-25 PROCEDURE — 36415 COLL VENOUS BLD VENIPUNCTURE: CPT | Performed by: STUDENT IN AN ORGANIZED HEALTH CARE EDUCATION/TRAINING PROGRAM

## 2019-04-25 PROCEDURE — 25000125 ZZHC RX 250: Performed by: STUDENT IN AN ORGANIZED HEALTH CARE EDUCATION/TRAINING PROGRAM

## 2019-04-25 PROCEDURE — 12000001 ZZH R&B MED SURG/OB UMMC

## 2019-04-25 PROCEDURE — 85049 AUTOMATED PLATELET COUNT: CPT | Performed by: STUDENT IN AN ORGANIZED HEALTH CARE EDUCATION/TRAINING PROGRAM

## 2019-04-25 PROCEDURE — 0064U ANTB TP TOTAL&RPR IA QUAL: CPT | Performed by: STUDENT IN AN ORGANIZED HEALTH CARE EDUCATION/TRAINING PROGRAM

## 2019-04-25 RX ORDER — ONDANSETRON 2 MG/ML
4 INJECTION INTRAMUSCULAR; INTRAVENOUS EVERY 6 HOURS PRN
Status: DISCONTINUED | OUTPATIENT
Start: 2019-04-25 | End: 2019-04-26

## 2019-04-25 RX ORDER — TERBUTALINE SULFATE 1 MG/ML
0.25 INJECTION, SOLUTION SUBCUTANEOUS
Status: DISCONTINUED | OUTPATIENT
Start: 2019-04-25 | End: 2019-04-25

## 2019-04-25 RX ORDER — PROCHLORPERAZINE 25 MG
25 SUPPOSITORY, RECTAL RECTAL EVERY 12 HOURS PRN
Status: DISCONTINUED | OUTPATIENT
Start: 2019-04-25 | End: 2019-04-26

## 2019-04-25 RX ORDER — LIDOCAINE 40 MG/G
CREAM TOPICAL
Status: DISCONTINUED | OUTPATIENT
Start: 2019-04-25 | End: 2019-04-26

## 2019-04-25 RX ORDER — CARBOPROST TROMETHAMINE 250 UG/ML
250 INJECTION, SOLUTION INTRAMUSCULAR
Status: DISCONTINUED | OUTPATIENT
Start: 2019-04-25 | End: 2019-04-26

## 2019-04-25 RX ORDER — SODIUM CHLORIDE, SODIUM LACTATE, POTASSIUM CHLORIDE, CALCIUM CHLORIDE 600; 310; 30; 20 MG/100ML; MG/100ML; MG/100ML; MG/100ML
INJECTION, SOLUTION INTRAVENOUS CONTINUOUS
Status: DISCONTINUED | OUTPATIENT
Start: 2019-04-25 | End: 2019-04-26

## 2019-04-25 RX ORDER — MISOPROSTOL 200 UG/1
TABLET ORAL
Status: DISCONTINUED
Start: 2019-04-25 | End: 2019-04-26 | Stop reason: HOSPADM

## 2019-04-25 RX ORDER — OXYTOCIN 10 [USP'U]/ML
INJECTION, SOLUTION INTRAMUSCULAR; INTRAVENOUS
Status: DISCONTINUED
Start: 2019-04-25 | End: 2019-04-26 | Stop reason: HOSPADM

## 2019-04-25 RX ORDER — OXYTOCIN/0.9 % SODIUM CHLORIDE 30/500 ML
PLASTIC BAG, INJECTION (ML) INTRAVENOUS
Status: DISCONTINUED
Start: 2019-04-25 | End: 2019-04-26 | Stop reason: HOSPADM

## 2019-04-25 RX ORDER — CITRIC ACID/SODIUM CITRATE 334-500MG
30 SOLUTION, ORAL ORAL ONCE
Status: DISCONTINUED | OUTPATIENT
Start: 2019-04-25 | End: 2019-04-26

## 2019-04-25 RX ORDER — OXYCODONE AND ACETAMINOPHEN 5; 325 MG/1; MG/1
1 TABLET ORAL
Status: DISCONTINUED | OUTPATIENT
Start: 2019-04-25 | End: 2019-04-26

## 2019-04-25 RX ORDER — OXYTOCIN/0.9 % SODIUM CHLORIDE 30/500 ML
100-340 PLASTIC BAG, INJECTION (ML) INTRAVENOUS CONTINUOUS PRN
Status: DISCONTINUED | OUTPATIENT
Start: 2019-04-25 | End: 2019-04-26

## 2019-04-25 RX ORDER — LIDOCAINE HYDROCHLORIDE 10 MG/ML
INJECTION, SOLUTION EPIDURAL; INFILTRATION; INTRACAUDAL; PERINEURAL
Status: DISCONTINUED
Start: 2019-04-25 | End: 2019-04-26 | Stop reason: HOSPADM

## 2019-04-25 RX ORDER — TERBUTALINE SULFATE 1 MG/ML
0.25 INJECTION, SOLUTION SUBCUTANEOUS
Status: DISCONTINUED | OUTPATIENT
Start: 2019-04-25 | End: 2019-04-26

## 2019-04-25 RX ORDER — METHYLERGONOVINE MALEATE 0.2 MG/ML
200 INJECTION INTRAVENOUS
Status: DISCONTINUED | OUTPATIENT
Start: 2019-04-25 | End: 2019-04-26

## 2019-04-25 RX ORDER — METOCLOPRAMIDE HYDROCHLORIDE 5 MG/ML
10 INJECTION INTRAMUSCULAR; INTRAVENOUS EVERY 6 HOURS PRN
Status: DISCONTINUED | OUTPATIENT
Start: 2019-04-25 | End: 2019-04-26

## 2019-04-25 RX ORDER — IBUPROFEN 800 MG/1
800 TABLET, FILM COATED ORAL
Status: COMPLETED | OUTPATIENT
Start: 2019-04-25 | End: 2019-04-26

## 2019-04-25 RX ORDER — OXYTOCIN/0.9 % SODIUM CHLORIDE 30/500 ML
1-24 PLASTIC BAG, INJECTION (ML) INTRAVENOUS CONTINUOUS
Status: DISCONTINUED | OUTPATIENT
Start: 2019-04-25 | End: 2019-04-26

## 2019-04-25 RX ORDER — OXYTOCIN 10 [USP'U]/ML
10 INJECTION, SOLUTION INTRAMUSCULAR; INTRAVENOUS
Status: DISCONTINUED | OUTPATIENT
Start: 2019-04-25 | End: 2019-04-26

## 2019-04-25 RX ORDER — FENTANYL CITRATE 50 UG/ML
50-100 INJECTION, SOLUTION INTRAMUSCULAR; INTRAVENOUS
Status: DISCONTINUED | OUTPATIENT
Start: 2019-04-25 | End: 2019-04-26

## 2019-04-25 RX ORDER — ACETAMINOPHEN 325 MG/1
650 TABLET ORAL EVERY 4 HOURS PRN
Status: DISCONTINUED | OUTPATIENT
Start: 2019-04-25 | End: 2019-04-26

## 2019-04-25 RX ORDER — MISOPROSTOL 100 UG/1
25 TABLET ORAL
Status: DISCONTINUED | OUTPATIENT
Start: 2019-04-25 | End: 2019-04-25

## 2019-04-25 RX ORDER — NALOXONE HYDROCHLORIDE 0.4 MG/ML
.1-.4 INJECTION, SOLUTION INTRAMUSCULAR; INTRAVENOUS; SUBCUTANEOUS
Status: DISCONTINUED | OUTPATIENT
Start: 2019-04-25 | End: 2019-04-26

## 2019-04-25 RX ADMIN — Medication 25 MCG: at 17:31

## 2019-04-25 RX ADMIN — Medication 25 MCG: at 15:06

## 2019-04-25 RX ADMIN — Medication 25 MCG: at 20:35

## 2019-04-25 RX ADMIN — Medication 25 MCG: at 11:07

## 2019-04-25 RX ADMIN — Medication 25 MCG: at 12:58

## 2019-04-25 RX ADMIN — OXYTOCIN-SODIUM CHLORIDE 0.9% IV SOLN 30 UNIT/500ML 2 MILLI-UNITS/MIN: 30-0.9/5 SOLUTION at 23:56

## 2019-04-25 ASSESSMENT — MIFFLIN-ST. JEOR: SCORE: 1607.19

## 2019-04-25 NOTE — H&P
[facility]  OB History and Physical      Parul Herrera MRN# [id number]   Age: 33 year old YOB: 1985     CC:  Scheduled IOL for fetal b/l pleural effusions    HPI:  Ms. Parul Herrera is a 33 year old  at 37w0d by 8+0wk US, who presents for scheduled IOL due to severe fetal pleural effusions.  She denies contractions, vaginal bleeding, and loss of fluid.   + normal fetal movement.    Pregnancy Complications:  -  Fetal pleural effusions, possibl chylothorax w/ b/l shunts (placed at 27+1 and 29+5). S/p BMZ x2 courses, last 3/5-. 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 Anatomy wnl except suboptimal spine view and b/l pleural effusions.    19 - Cephalic, ant placenta, MVP 6.5cm. Bl pleural effusions (L>R), complex fluid of right lung, possible clot. NO fetal hydrops      OB History  OB History    Para Term  AB Living   2 1 1 0 0 1   SAB TAB Ectopic Multiple Live Births   0 0 0 0 1      # Outcome Date GA Lbr Mu/2nd Weight Sex Delivery Anes PTL Lv   2 Current            1 Term         HONG     PMHx:   No past medical history on file.     PSHx:   Past Surgical History:   Procedure Laterality Date     ENT SURGERY      wisdom teeth      FETAL INJECTION W/ OR W/O ULTRASOUND GUIDANCE N/A 2019    Procedure: FETAL INJECTION W/ OR W/O ULTRASOUND GUIDANCE;  Surgeon: Michelle Amaya MD;  Location: [facility]     FETAL INJECTION W/ OR W/O ULTRASOUND GUIDANCE N/A 2019    Procedure: FETAL INJECTION W/ OR W/O ULTRASOUND GUIDANCE AND MATERNAL FETAL MEDICINE SONOGRAPHER WILL COME;  Surgeon: Eliceo Rosas  "MD ANDRE;  Location: [facility] L+D     INSERT FETAL THORACOAMNIOTIC SHUNT N/A 3/26/2019    Procedure: THORACENTESIS;  Surgeon: Yoselyn Cat MD;  Location: [facility] L+[facility]     Meds:   Medications Prior to Admission   Medication Sig Dispense Refill Last Dose     hydrOXYzine (VISTARIL) 25 MG capsule Take 1 capsule (25 mg) by mouth nightly as needed for other (Take 1 to 2 tabs by mouth at night as sleep aid.) 30 capsule 1 2019 at Unknown time     Prenatal Vit-Fe Fumarate-FA (PRENATAL MULTIVITAMIN PLUS IRON) 27-0.8 MG TABS per tablet Take 1 tablet by mouth daily   2019 at Unknown time     Allergies:  No Known Allergies   FmHx: No family history on file.  SocHx: She denies any tobacco, alcohol, or other drug use during this pregnancy.    ROS:   Complete 10-point ROS negative except as noted in HPI.She denies headache, blurry vision, chest pain, shortness of breath, RUQ pain, nausea, vomiting, dysuria, hematuria or extremity edema.    PE:  Vit:   Patient Vitals for the past 4 hrs:   BP Temp Temp src Heart Rate Resp Height Weight   19 0848 131/78 98.5  F (36.9  C) Oral 104 20 1.727 m (5' 8\") 84.4 kg (186 lb)      Gen: Well-appearing, NAD, comfortable   CV: rrr, no mrg   Pulm: Ctab, no wheezes or crackles   Abd: Soft, gravid, non-tender  Ext: no LE edema b/l  Cx: FT/long/high    Pres:  vtx by BSUS  EFW:  7# by Leopold's  Memb: intact             FHT: Baseline 140, mod variability, + accelerations, no decelerations   [place]: irreg contractionsq2-15min     Assessment  Ms. Parul Herrera is a 33 year old , at 37w0d by 8+0wk US, who presents for scheduled IOL due to fetal pleural effusions.    Plan  Admit to L&D  #Induction of labor: Start w/ cervical ripening. PO miso. Recheck cvx s/p 3 miso. Discussed pacheco for ripening.  - Desires epidural eventually for analgesia  - Reg diet, IVF    #Fetal b/l pleural effusions: Leading dx is chylothorax. Normal FISH, normal anatomy otherwise. B/l shunts in place, one is not draining " os may be sucked under skin per MFM.  - plan removal at time of delivery, ASAP, prior to stimulation of baby  - if baby allowed to cry may internalize shunts and need surgical removal  - NICU at delivery  - s/p BMZ x2 courses, last 3/5-6    #gHTN: BP mild range. No symptoms. HELLP labs and UP:C wnl yesterday. Repeat PRN    #FWB: Category 1 FHT.  Continue EFM and toco  - GBS neg  - EFW 7#, vtx by BSUS, placenta anterior    #PNC: Rh pos, Rubella , , anatomy wnl      The patient was discussed with Dr. Alexis who is in agreement with the treatment plan.    Lamar Taveras MD  OBGYN PGY-3  10:15 AM 4/25/2019    Appreciate note by Dr. Taveras. Patient has been seen and examined by me separate from the resident, agree with above note. Plan for removal of shunts at time of delivery, delay stimulation until shunts removed.     Francine Alexis MD  4:25 PM

## 2019-04-25 NOTE — PROGRESS NOTES
[facility]  Labor Progress Note    Strip review    O:   Patient Vitals for the past 4 hrs:   BP Temp Temp src Resp   19 1555 129/76 98.4  F (36.9  C) Oral 18     FHT:     Baseline 140, mod variability, + accelerations, single variable decel at 15:39    [place]:    ctx q4-6min    A/P:  Ms. Parul Herrera is a 33 year old , at 37w0d by 8+0wk US, who presents for scheduled IOL due to fetal pleural effusions.    #Induction of labor: Will check cvx with next miso - place pacheco for ripening if able  - remove shunts at del prior to fetal stim  - SCN at delivery    #gHTN: BP mild range. No symptoms. HELLP labs and UP:C wnl yesterday. Repeat PRN     #FWB:  Category 1 FHT.  Continue EFM and toco  - GBS neg  - EFW 7#, vtx by BSUS, placenta anterior    Lamar Taveras MD  Ob/Gyn, PGY-3  2019, 4:09 PM

## 2019-04-25 NOTE — PLAN OF CARE
Data: Patient presented to [facility] at 0800.   Reason for maternal/fetal assessment per patient is Induction Of Labor  .  Patient is a . Prenatal record reviewed.      OB History    Para Term  AB Living   2 1 1 0 0 1   SAB TAB Ectopic Multiple Live Births   0 0 0 0 1      # Outcome Date GA Lbr Mu/2nd Weight Sex Delivery Anes PTL Lv   2 Current            1 Term         HONG   . Medical history: No past medical history on file.. Gestational Age 37w0d. VSS. Fetal movement present. Baby has pleural effusion with TWO Shunts in baby chest. Patient denies cramping, backache, vaginal discharge, pelvic pressure, UTI symptoms, GI problems, bloody show, vaginal bleeding, edema, headache, visual disturbances, epigastric or URQ pain, abdominal pain, rupture of membranes. Support persons are present, her  Alpesh.  Action: Verbal consent for EFM. Triage assessment completed. EFM applied for fetal well being. Uterine assessment reveal contractions about every 15 minutes. Fetal assessment: Presumed adequate fetal oxygenation documented (see flow record).   Response: Binh Carlisle and Darcy informed of arrival. Plan per provider is IOL. Patient verbalized agreement with plan. Patient admitted to room 442 ambulatory, oriented to room and call light.

## 2019-04-26 ENCOUNTER — ANESTHESIA (OUTPATIENT)
Dept: OBGYN | Facility: CLINIC | Age: 34
End: 2019-04-26
Payer: COMMERCIAL

## 2019-04-26 ENCOUNTER — ANESTHESIA EVENT (OUTPATIENT)
Dept: OBGYN | Facility: CLINIC | Age: 34
End: 2019-04-26
Payer: COMMERCIAL

## 2019-04-26 LAB
ALT SERPL W P-5'-P-CCNC: 13 U/L (ref 0–50)
AST SERPL W P-5'-P-CCNC: 37 U/L (ref 0–45)
CREAT SERPL-MCNC: 0.66 MG/DL (ref 0.52–1.04)
ERYTHROCYTE [DISTWIDTH] IN BLOOD BY AUTOMATED COUNT: 12.9 % (ref 10–15)
GFR SERPL CREATININE-BSD FRML MDRD: >90 ML/MIN/{1.73_M2}
HCT VFR BLD AUTO: 38.5 % (ref 35–47)
HGB BLD-MCNC: 12.8 G/DL (ref 11.7–15.7)
MCH RBC QN AUTO: 28.7 PG (ref 26.5–33)
MCHC RBC AUTO-ENTMCNC: 33.2 G/DL (ref 31.5–36.5)
MCV RBC AUTO: 86 FL (ref 78–100)
PLATELET # BLD AUTO: 243 10E9/L (ref 150–450)
RBC # BLD AUTO: 4.46 10E12/L (ref 3.8–5.2)
WBC # BLD AUTO: 22.1 10E9/L (ref 4–11)

## 2019-04-26 PROCEDURE — 40000671 ZZH STATISTIC ANESTHESIA CASE

## 2019-04-26 PROCEDURE — 3E0R3BZ INTRODUCTION OF ANESTHETIC AGENT INTO SPINAL CANAL, PERCUTANEOUS APPROACH: ICD-10-PCS | Performed by: ANESTHESIOLOGY

## 2019-04-26 PROCEDURE — 25000132 ZZH RX MED GY IP 250 OP 250 PS 637: Performed by: STUDENT IN AN ORGANIZED HEALTH CARE EDUCATION/TRAINING PROGRAM

## 2019-04-26 PROCEDURE — 12000001 ZZH R&B MED SURG/OB UMMC

## 2019-04-26 PROCEDURE — 85027 COMPLETE CBC AUTOMATED: CPT | Performed by: STUDENT IN AN ORGANIZED HEALTH CARE EDUCATION/TRAINING PROGRAM

## 2019-04-26 PROCEDURE — 25000125 ZZHC RX 250: Performed by: STUDENT IN AN ORGANIZED HEALTH CARE EDUCATION/TRAINING PROGRAM

## 2019-04-26 PROCEDURE — 25000128 H RX IP 250 OP 636: Performed by: STUDENT IN AN ORGANIZED HEALTH CARE EDUCATION/TRAINING PROGRAM

## 2019-04-26 PROCEDURE — 82565 ASSAY OF CREATININE: CPT | Performed by: STUDENT IN AN ORGANIZED HEALTH CARE EDUCATION/TRAINING PROGRAM

## 2019-04-26 PROCEDURE — 00HU33Z INSERTION OF INFUSION DEVICE INTO SPINAL CANAL, PERCUTANEOUS APPROACH: ICD-10-PCS | Performed by: ANESTHESIOLOGY

## 2019-04-26 PROCEDURE — 25800030 ZZH RX IP 258 OP 636: Performed by: STUDENT IN AN ORGANIZED HEALTH CARE EDUCATION/TRAINING PROGRAM

## 2019-04-26 PROCEDURE — 10907ZC DRAINAGE OF AMNIOTIC FLUID, THERAPEUTIC FROM PRODUCTS OF CONCEPTION, VIA NATURAL OR ARTIFICIAL OPENING: ICD-10-PCS | Performed by: OBSTETRICS & GYNECOLOGY

## 2019-04-26 PROCEDURE — 36415 COLL VENOUS BLD VENIPUNCTURE: CPT | Performed by: STUDENT IN AN ORGANIZED HEALTH CARE EDUCATION/TRAINING PROGRAM

## 2019-04-26 PROCEDURE — 84460 ALANINE AMINO (ALT) (SGPT): CPT | Performed by: STUDENT IN AN ORGANIZED HEALTH CARE EDUCATION/TRAINING PROGRAM

## 2019-04-26 PROCEDURE — 0KQM0ZZ REPAIR PERINEUM MUSCLE, OPEN APPROACH: ICD-10-PCS | Performed by: OBSTETRICS & GYNECOLOGY

## 2019-04-26 PROCEDURE — 84450 TRANSFERASE (AST) (SGOT): CPT | Performed by: STUDENT IN AN ORGANIZED HEALTH CARE EDUCATION/TRAINING PROGRAM

## 2019-04-26 PROCEDURE — 72200001 ZZH LABOR CARE VAGINAL DELIVERY SINGLE

## 2019-04-26 RX ORDER — OXYTOCIN/0.9 % SODIUM CHLORIDE 30/500 ML
100 PLASTIC BAG, INJECTION (ML) INTRAVENOUS CONTINUOUS
Status: DISCONTINUED | OUTPATIENT
Start: 2019-04-26 | End: 2019-04-28 | Stop reason: HOSPADM

## 2019-04-26 RX ORDER — DIPHENHYDRAMINE HCL 25 MG
25-50 CAPSULE ORAL EVERY 6 HOURS PRN
Status: DISCONTINUED | OUTPATIENT
Start: 2019-04-26 | End: 2019-04-28 | Stop reason: HOSPADM

## 2019-04-26 RX ORDER — FENTANYL CITRATE 50 UG/ML
INJECTION, SOLUTION INTRAMUSCULAR; INTRAVENOUS PRN
Status: DISCONTINUED | OUTPATIENT
Start: 2019-04-26 | End: 2019-04-26

## 2019-04-26 RX ORDER — ACETAMINOPHEN 325 MG/1
650 TABLET ORAL EVERY 4 HOURS PRN
Status: DISCONTINUED | OUTPATIENT
Start: 2019-04-26 | End: 2019-04-28 | Stop reason: HOSPADM

## 2019-04-26 RX ORDER — AMOXICILLIN 250 MG
2 CAPSULE ORAL 2 TIMES DAILY
Status: DISCONTINUED | OUTPATIENT
Start: 2019-04-26 | End: 2019-04-28 | Stop reason: HOSPADM

## 2019-04-26 RX ORDER — OXYTOCIN 10 [USP'U]/ML
10 INJECTION, SOLUTION INTRAMUSCULAR; INTRAVENOUS
Status: DISCONTINUED | OUTPATIENT
Start: 2019-04-26 | End: 2019-04-28 | Stop reason: HOSPADM

## 2019-04-26 RX ORDER — LANOLIN 100 %
OINTMENT (GRAM) TOPICAL
Status: DISCONTINUED | OUTPATIENT
Start: 2019-04-26 | End: 2019-04-28 | Stop reason: HOSPADM

## 2019-04-26 RX ORDER — NALOXONE HYDROCHLORIDE 0.4 MG/ML
.1-.4 INJECTION, SOLUTION INTRAMUSCULAR; INTRAVENOUS; SUBCUTANEOUS
Status: DISCONTINUED | OUTPATIENT
Start: 2019-04-26 | End: 2019-04-26

## 2019-04-26 RX ORDER — METHYLERGONOVINE MALEATE 0.2 MG/ML
200 INJECTION INTRAVENOUS
Status: DISCONTINUED | OUTPATIENT
Start: 2019-04-26 | End: 2019-04-28 | Stop reason: HOSPADM

## 2019-04-26 RX ORDER — MISOPROSTOL 200 UG/1
800 TABLET ORAL
Status: DISCONTINUED | OUTPATIENT
Start: 2019-04-26 | End: 2019-04-28 | Stop reason: HOSPADM

## 2019-04-26 RX ORDER — NALBUPHINE HYDROCHLORIDE 10 MG/ML
2.5-5 INJECTION, SOLUTION INTRAMUSCULAR; INTRAVENOUS; SUBCUTANEOUS EVERY 6 HOURS PRN
Status: DISCONTINUED | OUTPATIENT
Start: 2019-04-26 | End: 2019-04-26

## 2019-04-26 RX ORDER — AMOXICILLIN 250 MG
1 CAPSULE ORAL 2 TIMES DAILY
Status: DISCONTINUED | OUTPATIENT
Start: 2019-04-26 | End: 2019-04-28 | Stop reason: HOSPADM

## 2019-04-26 RX ORDER — BISACODYL 10 MG
10 SUPPOSITORY, RECTAL RECTAL DAILY PRN
Status: DISCONTINUED | OUTPATIENT
Start: 2019-04-28 | End: 2019-04-28 | Stop reason: HOSPADM

## 2019-04-26 RX ORDER — EPHEDRINE SULFATE 50 MG/ML
5 INJECTION, SOLUTION INTRAMUSCULAR; INTRAVENOUS; SUBCUTANEOUS
Status: DISCONTINUED | OUTPATIENT
Start: 2019-04-26 | End: 2019-04-26

## 2019-04-26 RX ORDER — NALOXONE HYDROCHLORIDE 0.4 MG/ML
.1-.4 INJECTION, SOLUTION INTRAMUSCULAR; INTRAVENOUS; SUBCUTANEOUS
Status: DISCONTINUED | OUTPATIENT
Start: 2019-04-26 | End: 2019-04-28 | Stop reason: HOSPADM

## 2019-04-26 RX ORDER — OXYTOCIN/0.9 % SODIUM CHLORIDE 30/500 ML
340 PLASTIC BAG, INJECTION (ML) INTRAVENOUS CONTINUOUS PRN
Status: DISCONTINUED | OUTPATIENT
Start: 2019-04-26 | End: 2019-04-28 | Stop reason: HOSPADM

## 2019-04-26 RX ORDER — IBUPROFEN 800 MG/1
800 TABLET, FILM COATED ORAL EVERY 6 HOURS PRN
Status: DISCONTINUED | OUTPATIENT
Start: 2019-04-26 | End: 2019-04-28 | Stop reason: HOSPADM

## 2019-04-26 RX ORDER — CARBOPROST TROMETHAMINE 250 UG/ML
250 INJECTION, SOLUTION INTRAMUSCULAR
Status: DISCONTINUED | OUTPATIENT
Start: 2019-04-26 | End: 2019-04-28 | Stop reason: HOSPADM

## 2019-04-26 RX ORDER — HYDROCORTISONE 2.5 %
CREAM (GRAM) TOPICAL 3 TIMES DAILY PRN
Status: DISCONTINUED | OUTPATIENT
Start: 2019-04-26 | End: 2019-04-28 | Stop reason: HOSPADM

## 2019-04-26 RX ORDER — FENTANYL/BUPIVACAINE/NS/PF 2-1250MCG
PLASTIC BAG, INJECTION (ML) INJECTION
Status: COMPLETED
Start: 2019-04-26 | End: 2019-04-26

## 2019-04-26 RX ADMIN — IBUPROFEN 800 MG: 800 TABLET ORAL at 09:36

## 2019-04-26 RX ADMIN — Medication 10 ML/HR: at 00:38

## 2019-04-26 RX ADMIN — FENTANYL CITRATE 20 MCG: 50 INJECTION, SOLUTION INTRAMUSCULAR; INTRAVENOUS at 00:38

## 2019-04-26 RX ADMIN — IBUPROFEN 800 MG: 800 TABLET ORAL at 23:12

## 2019-04-26 RX ADMIN — ACETAMINOPHEN 650 MG: 325 TABLET, FILM COATED ORAL at 12:51

## 2019-04-26 RX ADMIN — ACETAMINOPHEN 650 MG: 325 TABLET, FILM COATED ORAL at 17:04

## 2019-04-26 RX ADMIN — SODIUM CHLORIDE, POTASSIUM CHLORIDE, SODIUM LACTATE AND CALCIUM CHLORIDE 1000 ML: 600; 310; 30; 20 INJECTION, SOLUTION INTRAVENOUS at 06:39

## 2019-04-26 RX ADMIN — OXYTOCIN-SODIUM CHLORIDE 0.9% IV SOLN 30 UNIT/500ML 2 MILLI-UNITS/MIN: 30-0.9/5 SOLUTION at 05:47

## 2019-04-26 RX ADMIN — SENNOSIDES AND DOCUSATE SODIUM 2 TABLET: 8.6; 5 TABLET ORAL at 20:06

## 2019-04-26 RX ADMIN — DIPHENHYDRAMINE HYDROCHLORIDE 25 MG: 25 CAPSULE ORAL at 23:12

## 2019-04-26 RX ADMIN — Medication 10 ML: at 00:38

## 2019-04-26 RX ADMIN — ACETAMINOPHEN 650 MG: 325 TABLET, FILM COATED ORAL at 21:42

## 2019-04-26 RX ADMIN — IBUPROFEN 800 MG: 800 TABLET ORAL at 17:05

## 2019-04-26 NOTE — PROGRESS NOTES
[facility]  Labor Progress Note    S: Feeling cramping of lower abdomen, tried pressing bolus, not yet improved. No pressure or desire to push.    O:    Patient Vitals for the past 2 hrs:   BP SpO2   19 0719 -- 100 %   19 0716 129/84 --   19 0714 -- 100 %   19 0709 -- 99 %   19 0704 -- 99 %   19 0659 -- 99 %   19 0654 -- 96 %   19 0649 -- 95 %   19 0644 -- 97 %   19 0639 -- 95 %   19 0634 -- 96 %   19 0629 -- 96 %   19 0624 -- 96 %   19 0619 -- 97 %   19 0616 122/58 --   19 0614 -- 92 %   19 0613 -- (!) 88 %   19 0609 -- 90 %   19 0607 -- (!) 89 %   19 0604 -- (!) 88 %   19 0559 -- (!) 84 %   19 0554 -- (!) 88 %   19 0549 -- (!) 89 %   19 0546 -- (!) 88 %   19 0544 -- (!) 89 %   19 0539 -- (!) 89 %   19 0534 -- (!) 89 %   19 0529 -- 90 %     FHT:     Baseline 130-140, moderate variability, + accelerations, recurrent variable decelerations to 90s, improved with position change to right side   [place]:    ctx q2min    A/P:  Ms. Parul Herrera is a 33 year old , at 37w0d by 8+0wk US undergoing IOL due to fetal pleural effusions.    #Induction of labor:  - s/p misoprostol, pacheco > pitocin. Pit off overnight as NICU preferred daytime delivery. Now at 1mU/min - anticipate  soon  - GBS neg    #gHTN  - Normotensive to mild range. No symptoms.  - HELLP labs and UPC wnl . Repeat PRN     #Fetus:  - Category 2 FHT - responds to repositioning.  Continue EFM and toco. FSE PRN as period of nontracing prior to AROM but tracing well currently  - bilateral pleural effusion, likely chylothorax, shunts in place. Normal amnio.  - remove shunts at delivery prior to stimulating   - NICU at delivery  - EFW 7#, vtx by BSUS, placenta anterior    Lamar Taveras MD  Ob/Gyn, PGY-3  2019  7:23 AM

## 2019-04-26 NOTE — ANESTHESIA PREPROCEDURE EVALUATION
Anesthesia Pre-Procedure Evaluation    Patient: Parul Herrera   MRN:     [id number] Gender:   female   Age:    33 year old :      1985        Preoperative Diagnosis: * No surgery found *        No past medical history on file.   Past Surgical History:   Procedure Laterality Date     ENT SURGERY      wisdom teeth 2005     FETAL INJECTION W/ OR W/O ULTRASOUND GUIDANCE N/A 2019    Procedure: FETAL INJECTION W/ OR W/O ULTRASOUND GUIDANCE;  Surgeon: Michelle Amaya MD;  [facility]     FETAL INJECTION W/ OR W/O ULTRASOUND GUIDANCE N/A 2019    Procedure: FETAL INJECTION W/ OR W/O ULTRASOUND GUIDANCE AND MATERNAL FETAL MEDICINE SONOGRAPHER WILL COME;  Surgeon: Eliceo Rosas MD;  [facility]     INSERT FETAL THORACOAMNIOTIC SHUNT N/A 3/26/2019    Procedure: THORACENTESIS;  Surgeon: Yoselyn Cat MD;  [facility]: [facility] L[facility]          Anesthesia Evaluation       history and physical reviewed .      No history of anesthetic complications          ROS/MED HX    ENT/Pulmonary:  - neg pulmonary ROS     Neurologic:  - neg neurologic ROS     Cardiovascular: Comment: gHTN        METS/Exercise Tolerance:     Hematologic:         Musculoskeletal:         GI/Hepatic:  - neg GI/hepatic ROS       Renal/Genitourinary:         Endo:         Psychiatric:         Infectious Disease:         Malignancy:         Other:                     [facility] AN PHYSICAL EXAM    Lab Results   Component Value Date    WBC 13.3 (H) 2019    HGB 12.6 2019    HCT 40.1 2019     2019     2019    POTASSIUM 3.7 2019    CHLORIDE 108 2019    CO2 22 2019    BUN 6 (L) 2019    CR 0.57 2019     (H) 2019    VEE 8.4 (L) 2019    ALT 11 2019    AST 23 2019       Preop Vitals  BP Readings from Last 3 Encounters:   19 (!) 139/94   19 135/85   19 131/80    Pulse Readings from Last 3 Encounters:   19 88   19 112  "  04/22/19 95      Resp Readings from Last 3 Encounters:   04/25/19 18   04/22/19 18   04/15/19 18    SpO2 Readings from Last 3 Encounters:   04/17/19 97%   03/25/19 99%   01/31/19 100%      Temp Readings from Last 1 Encounters:   04/25/19 36.9  C (98.4  F) (Oral)    Ht Readings from Last 1 Encounters:   04/25/19 1.727 m (5' 8\")      Wt Readings from Last 1 Encounters:   04/25/19 84.4 kg (186 lb)    Estimated body mass index is 28.28 kg/m  as calculated from the following:    Height as of this encounter: 1.727 m (5' 8\").    Weight as of this encounter: 84.4 kg (186 lb).     LDA:  Peripheral IV 04/25/19 Distal;Left;Posterior Lower forearm (Active)   Site Assessment WDL 4/25/2019  7:47 PM   Line Status Saline locked 4/25/2019  7:47 PM   Phlebitis Scale 0-->no symptoms 4/25/2019  7:47 PM   Infiltration Scale 0 4/25/2019  7:47 PM   Number of days: 1            JZ[facility] AN PLAN NO PONV RULE    neg OB ROS  (-) no pre-eclampsia and gestational diabetes                 Tony Mariano MD  "

## 2019-04-26 NOTE — L&D DELIVERY NOTE
OB Vaginal Delivery Note    Parul Herrera MRN# [id number]   Age: 33 year old YOB: 1985       GA: 37w1d  GP:   Labor Complications: Preeclampsia/Hypertension   EBL:    mL  QBL:    Delivery Type: Vaginal, Spontaneous   ROM to Delivery Time: 1h 09m   Weight: 3.37 kg (7 lb 6.9 oz)    1 Minute 5 Minute 10 Minute   Apgar Totals: 5    9          LAMAR TAVERAS     Delivery Details:  Ms. Parul Herrera is a 33 year old now  at 37w1d, admitted for induction of labor in pregnancy complicated by fetal bilateral pleural effusions, thought to be due to chylothorax. She underwent cervical ripening with PO misoprostol and a pacheco balloon. She received an epidural for pain control, pitocin and AROM for augmentation. She progressed to complete dilation, pushed and with good maternal effort delivered a liveborn male infant on 19 at 0822 from the ODALIS position. Shoulders delivered easily, prior to stimulation the bilateral shunts were removed from the fetal chest and remainder of body delivered. Infant with poor tone, no effort to cry and given defect in chest wall with shunts, the cord was immediately clamped and he was given to NICU staff for evaluation and resuscitation. Apgars 5/9, weight 3370g. IV pitocin started. Placenta delivered with fundal massage, gentle cord traction and suprapubic countertraction; noted to be intact with 3 vessel cord. Second degree perineal laceration, repaired with 3-0 Vicryl in standard fashion. A subcutaneous extra running locked 3-0 Vicryl was placed at the perineal mucosal/skin junction due to bleeding with good control. Fundus firm and lochia minimal at the end of the case. QBL 400mL.    Dr. Coy was present and gloved for entire delivery and repair.    Lamar Taveras MD  Ob/Gyn, PGY-3  2019, 6:15 PM    Staff MD Note  I was present and gloved for the entire delivery noted above.  I agree with the description above and any necessary changes have been  made by me.  Annemarie Coy MD  19         Labor Event Times    Labor onset date:  19 Onset time:  11:50 PM   Dilation complete date:  19 Complete time:   8:00 AM   Start pushing date/time:  2019 0800      Labor Length    1st Stage (hrs):  8 (min):  10   2nd Stage (hrs):  0 (min):  22   3rd Stage (hrs):  0 (min):  12      Labor Events     labor?:  No   steroids:  None  Labor Type:  Induction  Predominate monitoring during 1st stage:  continuous electronic fetal monitoring     Rupture date/time: 19 0713   Rupture type:  Artificial Rupture of Membranes  Fluid color:  Clear  Fluid odor:  Normal     Induction:  Misoprostol, Mechanical ripening agent, Oxytocin  Induction date/time:     Cervical ripening date/time:     Indications for induction:  Fetal Abnormality      Labor partogram used?:  no      Delivery/Placenta Date and Time    Delivery Date:  19 Delivery Time:   8:22 AM   Placenta Date/Time:  2019  8:34 AM  Oxytocin given at the time of delivery:  after delivery of baby     Vaginal Counts     Initial count performed by 2 team members:   Two Team Members   Belia Taveras       [place] [employer] [place] [employer]   Initial counts 2  5    Added to count 0 1 0    Final counts 2 1 5    Placed during labor Accounted for at the end of labor   NA    NA     Final count performed by 2 team members:   Two Team Members   Belia Coy      Final count correct?:  Yes     Apgars    Living status:  Living   1 Minute 5 Minute 10 Minute 15 Minute 20 Minute   Skin color: 0  1       Heart rate: 2  2       Reflex irritability: 1  2       Muscle tone: 1  2       Respiratory effort: 1  2       Total: 5  9       Apgars assigned by:  JUAN RAMON MURRAY NNP     Cord    Vessels:  3 Vessels Complications:  None       Resuscitation    Methods:  Oxygen, NCPAP, Oximetry, Temp Skin Probe  [place] Care at Delivery:  Asked by Dr. Taveras to attend the  delivery of this term, male infant with a gestational age of 37 1/7 weeks secondary to known pleural effusions in       Immediately after infant was born, bilateral chest shunts were clamped and removed by OB.  Cord was clamped and infant handed over to NICU team.  The infant was placed on the radiant warmer, stimulated and dried. He had weak respiratory effort, CPAP +5/20 initiated.  Pulse oximetry applied to right wrist.  FiO2 increased to 40% to maintain oxygen saturations in target range.  Oxygen saturations increased to 90%, FiO2 weaned to 30%.  Gross PE is WNL except for molded head and 2 chest punctures on both left and right side of chest.  Left site is draining moderate amounts of serous fluid.  On right upper abdomen there are a few abrasions, that are healing.Infant with retractions and nasal flaring.  Infant was shown to mother and father, handoff to nursery nurse and will be transferred to the NICU on CPAP for further care.    Kelli Soliz NN  2019 10:10 AM    Output in Delivery Room:  Voided      Measurements    Weight:  7 lb 6.9 oz       Labor Events and Shoulder Dystocia    Fetal Tracing Prior to Delivery:  Category 2  Fetal Tracing Comments:  variable decels intermittent during late first stage and second stage  Shoulder dystocia present?:  Neg     Delivery (Maternal) (Provider to Complete) ([id number])    Episiotomy:  None  Perineal lacerations:  2nd Repaired?:  Yes   Vaginal laceration?:  No    Cervical laceration?:  No       Blood Loss  Mother: Parul Herrera #[id number]   Start of Mother's Information    IO Blood Loss  19 2350 - 19 1817    Total QBL Blood Loss (mL) Hospital Encounter 400 mL    Total  400 mL         End of Mother's Information  Mother: Parul Herrera #[id number]         Delivery - Provider to Complete ([id number])    Delivering clinician:  Annemarie Coy MD  Attempted Delivery Types (Choose all that apply):  Spontaneous Vaginal Delivery  Delivery Type  (Choose the 1 that will go to the Birth History):  Vaginal, Spontaneous   Other personnel:   Provider Role   Lamar Taveras MD Resident         Placenta    Immediate Cord Clamping:  Done  Date/Time:  4/26/2019  8:34 AM  Removal:  Spontaneous  Disposition:  Hospital disposal     Anesthesia    Method:  Epidural  Cervical dilation at placement:  0-3          Presentation and Position    Presentation:  Vertex  Position:  Right Occiput Anterior           Lamar Taveras MD

## 2019-04-26 NOTE — PROGRESS NOTES
[facility]  Labor Progress Note    S: Patient comfortable with epidural.    O:   Patient Vitals for the past 12 hrs:   BP Temp Temp src Pulse Resp   19 2322 (!) 139/94 -- -- 88 19 125/87 -- -- -- 18   19 -- 98.4  F (36.9  C) Oral -- --   19 1555 129/76 98.4  F (36.9  C) Oral -- 18     FHT:     Baseline 130, moderate variability, + accelerations, no decelerations   [place]:    ctx q3min    A/P:  Ms. Parul Herrera is a 33 year old , at 37w0d by 8+0wk US undergoing IOL due to fetal pleural effusions.    #Induction of labor:  - s/p misoprostol and Frazier for ripening.  Patient was placed on pitocin for 1hr, however NICU communicated to charge ODIN Callahan that they prefer and daytime delivery.  Pitocin now off, will restart at 0500.  - GBS neg    #gHTN  - Normotensive to mild range. No symptoms.  - HELLP labs and UPC wnl yesterday. Repeat PRN     #Fetus:  - Category I FHT.  Continue EFM and toco  - bilateral pleural effusion, likely chylothorax, shunts in place. Normal amnio.  - remove shunts at delivery prior to stimulating   - NICU at delivery  - EFW 7#, vtx by BSUS, placenta anterior    Shila Mai MD  Ob/Gyn, PGY-3  2019  1:00 AM

## 2019-04-26 NOTE — PROGRESS NOTES
"Labor Progress Note     S: Patient feeling contractions. More uncomfortable.     O:  /87   Pulse 90   Temp 98.4  F (36.9  C) (Oral)   Resp 18   Ht 1.727 m (5' 8\")   Wt 84.4 kg (186 lb)   LMP 2018   BMI 28.28 kg/m    SVE: cervix 1/50%/-2 to -3; transcervical pacheco balloon placed using speculum, balloon filled with 60 ml; no LOF following placement, tolerated well    FHT: Baseline 135 bpm, normal range, moderate variability, accelerations present, absent decelerations  TOCO: contractions q3-5 min     A/P: 33 year old  at 37w0d by 8+0wk US undergoing IOL due to fetal pleural effusions.    # Induction of labor  - Transcervical pacheco balloon placed, filled with 60 ml; patient tolerated well  - Will continue concurrent Misoprostol in addition to pacheco balloon  - Plan to re-examine once pacheco balloon out     #gHTN  - Normotensive to mild range. No symptoms.  - HELLP labs and UPC wnl yesterday. Repeat PRN     # Fetus:  - Category I FHT. Continue EFM and toco  - bilateral pleural effusion, likely chylothorax, shunts in place. Normal amnio.  - remove shunts at delivery prior to stimulating   - SCN at delivery  - EFW 7#, vtx by BSUS, placenta anterior    Michelle Kristian, MS3    I was present with the medical student who participated in the service and in the documentation of this note.  I have verified the history and personally performed the physical exam and medical decision making, and have verified the content of the note, which accurately reflect my assessment of the patient and the plan of care.    Shila Mai MD  PGY-3 OB/GYN  [phone] (OB G3 Pager)        "

## 2019-04-26 NOTE — PROGRESS NOTES
Patient arrived to [facility] unit via wheelchair at 1145,with belongings, accompanied by family, with infant in NICU. Received report from Belia and checked bands. Unit and room orientation started. Call light given; no concerns present at this time. Continue with plan of care.

## 2019-04-26 NOTE — PROVIDER NOTIFICATION
04/26/19 1200 04/26/19 1335 04/26/19 1345   Vitals   BP (!) 142/96 (!) 146/91 (!) 144/91   Temp 97.5  F (36.4  C)  --   --    Temp src Oral  --   --    Pulse 100 106 101   Heart Rate 100 106 101   Resp 20  --   --    SpO2 97 %  --   --      Paged G2 re: elevated BPs since arrival to [facility]. Asked if labs should be checked. No pre-e signs/symptoms.

## 2019-04-26 NOTE — ANESTHESIA PROCEDURE NOTES
Epidural Procedure Note    Staff:     Anesthesiologist:  Dennys Reyes MD    Resident/CRNA:  Tony Mariano MD    Procedure performed by resident/CRNA in the presence of a teaching physician    Location: OB     Procedure start time:  4/26/2019 12:31 AM     Procedure end time:  4/26/2019 12:38 AM   Pre-procedure checklist:   patient identified, IV checked, site marked, risks and benefits discussed, informed consent, monitors and equipment checked, pre-op evaluation, at physician/surgeon's request and post-op pain management      Correct Patient: Yes      Correct Position: Yes      Correct Site: Yes      Correct Procedure: Yes      Correct Laterality:  Yes    Site Marked:  Yes  Procedure:     Procedure:  Epidural catheter    ASA:  2    Diagnosis:  Labor Pain    Position:  Sitting    Sterile Prep: chloraprep, mask, sterile gloves and patient draped      Insertion site:  L3-4    Local skin infiltration:  1% lidocaine    amount (mL):  3    Approach:  Midline    Needle gauge (G):  17    Needle Length (in):  3.5    Block Needle Type:  Touhy    Injection Technique:  LORT saline    MERI at (cm):  5    Attempts:  1    Redirects:  1    Catheter gauge (G):  19    Catheter threaded easily: Yes      Threaded to cm at skin:  9    Threaded in epidural space (cm):  4    Paresthesias:  No    Aspiration negative for Heme or CSF: Yes      Test dose (mL):  3     Local anesthetic:  Lidocaine 1.5% w/ 1:200,000 epinephrine    Test dose time:  00:36

## 2019-04-26 NOTE — PROGRESS NOTES
[facility]  Labor Progress Note    Patient feeling contractions    O:   Patient Vitals for the past 4 hrs:   BP Temp Temp src   19 125/87 -- --   19 -- 98.4  F (36.9  C) Oral     FHT:     Baseline 135, moderate variability, + accelerations, no decelerations   [place]:    ctx q4-5min    A/P:  Ms. Parul Herrera is a 33 year old , at 37w0d by 8+0wk US undergoing IOL due to fetal pleural effusions.    #Induction of labor:  - Will check cvx @ 2130 per plan made with patient previously - place transcervical pacheco for ripening if able  - GBS neg    #gHTN  - Normotensive to mild range. No symptoms.  - HELLP labs and UPC wnl yesterday. Repeat PRN     #Fetus:  - Category I FHT.  Continue EFM and toco  - bilateral pleural effusion, likely chylothorax, shunts in place. Normal amnio.  - remove shunts at delivery prior to stimulating   - SCN at delivery  - EFW 7#, vtx by BSUS, placenta anterior    Shila Mai MD  Ob/Gyn, PGY-3  2019  8:09 PM

## 2019-04-26 NOTE — PLAN OF CARE
BP 140s/90s.  Providers aware, ordered pre-e labs.  No pre-e signs/symptoms.  Abdomen distended, encouraged ambulation.  Fundus firm U/U.  IV SL.  Pumping independently, hand expressed drops of colostrum onto Qtip and sent down to baby.  Parents visited baby down in NICU.  Continue to monitor BP, await lab results.

## 2019-04-26 NOTE — PLAN OF CARE
Data: Parul Herrera transferred to [id number] via wheelchair at 1150 after visit to baby. Baby in NICU .  Action: Receiving unit notified of transfer: Yes. Patient and family notified of room change. Report given to Tanesha NEWBY at 1100. Belongings sent to receiving unit. Accompanied by Registered Nurse. Oriented patient to surroundings. Call light within reach. ID bands double-checked with receiving RN.  Response: Patient tolerated transfer and is stable.

## 2019-04-26 NOTE — PROGRESS NOTES
[facility]  Labor Progress Note    S: Patient feeling contractions, more uncomfortable.  Discussed Frazier balloon and patient is amenable.    O:   Patient Vitals for the past 4 hrs:   BP Temp Temp src Resp   19 125/87 -- -- 18   19 -- 98.4  F (36.9  C) Oral --     FHT:     Baseline 135, moderate variability, + accelerations, no decelerations   [place]:    ctx q3-4min    A/P:  Ms. Parul Herrera is a 33 year old , at 37w0d by 8+0wk US undergoing IOL due to fetal pleural effusions.    #Induction of labor:  - making some change with misoprostol, will place Frazier balloon shortly  - GBS neg    #gHTN  - Normotensive to mild range. No symptoms.  - HELLP labs and UPC wnl yesterday. Repeat PRN     #Fetus:  - Category I FHT.  Continue EFM and toco  - bilateral pleural effusion, likely chylothorax, shunts in place. Normal amnio.  - remove shunts at delivery prior to stimulating   - SCN at delivery  - EFW 7#, vtx by BSUS, placenta anterior    Shila Mai MD  Ob/Gyn, PGY-3  2019  9:39 PM

## 2019-04-26 NOTE — DISCHARGE SUMMARY
[facility] Discharge Summary    Parul Herrera MRN# [id number]   Age: 33 year old YOB: 1985     Date of Admission:  2019  Date of Discharge:  2019  Admitting Physician:  Annemarie Coy MD  Discharge Physician:  Violeta Ojeda MD    Admit Dx:   - Intrauterine pregnancy at 37w0d   - Fetal bilateral pleural effusions  - Gestational hypertension    Discharge Dx:  - Same as above, s/p   - s/p small fragment of retained placenta spontaneously passed    Procedures:  - Spontaneous vaginal delivery  - Epidural analgesia    Admit HPI/Labor Course:  Ms. Parul Herrera is a 33 year old now  at 37w1d, admitted for induction of labor in pregnancy complicated by fetal bilateral pleural effusions, thought to be due to chylothorax. She underwent cervical ripening with PO misoprostol and a pacheco balloon. She received an epidural for pain control, pitocin and AROM for augmentation. She progressed to complete dilation, pushed and with good maternal effort delivered a liveborn male infant on 19 at 0822 from the ODALIS position. Shoulders delivered easily, prior to stimulation the bilateral shunts were removed from the fetal chest and remainder of body delivered. Infant with poor tone, no effort to cry and given defect in chest wall with shunts, the cord was immediately clamped and he was given to NICU staff for evaluation and resuscitation. Apgars 5/9, weight 3370g. IV pitocin started. Placenta delivered with fundal massage, gentle cord traction and suprapubic countertraction; noted to be intact with 3 vessel cord. Second degree perineal laceration, repaired with 3-0 Vicryl in standard fashion. A subcutaneous extra running locked 3-0 Vicryl was placed at the perineal mucosal/skin junction due to bleeding with good control. Fundus firm and lochia minimal at the end of the case. QBL 400mL.  Please see her Admission H&P and Delivery Summary for further details.    Postpartum  Course:  Her postpartum course was uncomplicated except for a period of increased crampooiing and passage of a plum sized piece of placenta. On PPD#2, she was meeting all of her postpartum goals and deemed stable for discharge. She was voiding without difficulty, tolerating a regular diet without nausea and vomiting, her pain was well controlled on oral pain medicines and her lochia was appropriate. Her hemoglobin prior to delivery was 12.6 and after delivery was 12.0. Her Rh status was positive, and Rhogam was not indicated.     Discharge Medications:     Review of your medicines      START taking      Dose / Directions   acetaminophen 325 MG tablet  Commonly known as:  TYLENOL      Dose:  325-650 mg  Take 1-2 tablets (325-650 mg) by mouth every 6 hours as needed for mild pain  Quantity:  40 tablet  Refills:  0     ibuprofen 600 MG tablet  Commonly known as:  ADVIL/MOTRIN      Dose:  600 mg  Take 1 tablet (600 mg) by mouth every 6 hours as needed for moderate pain  Quantity:  40 tablet  Refills:  0     senna-docusate 8.6-50 MG tablet  Commonly known as:  SENOKOT-S/PERICOLACE      Dose:  1 tablet  Take 1 tablet by mouth daily  Quantity:  40 tablet  Refills:  0        CONTINUE these medicines which have NOT CHANGED      Dose / Directions   hydrOXYzine 25 MG capsule  Commonly known as:  VISTARIL  Used for:  Pleural effusion, fetal, affecting care of mother, antepartum      Dose:  25 mg  Take 1 capsule (25 mg) by mouth nightly as needed for other (Take 1 to 2 tabs by mouth at night as sleep aid.)  Quantity:  30 capsule  Refills:  1     prenatal multivitamin w/iron 27-0.8 MG tablet      Dose:  1 tablet  Take 1 tablet by mouth daily  Refills:  0           Where to get your medicines      These medications were sent to [facility] [place], [place] - [address]  [address] [address]    Phone:  [phone]     acetaminophen 325 MG tablet    ibuprofen 600 MG  tablet    senna-docusate 8.6-50 MG tablet       Discharge/Disposition:  Parul Herrera was discharged to home in stable condition with the following instructions/medications:  1) Call for temperature > 100.4, bright red vaginal bleeding >1 pad an hour x 2 hours, foul smelling vaginal discharge, pain not controlled by usual oral pain meds, persistent nausea and vomiting not controlled on medications  2) She is considering IUD at 6 week postpartum visit for contraception.  3) For feeding she decided to pump with plan to breastfeed  4) She was instructed to follow-up with her primary OB in 6 weeks for a routine postpartum visit and in 1 week for blood pressure check    Antwon Man MD  OB/GYN Resident, PGY-3  4/28/2019   OB/GYN Staff -- Pt seen and examined by me. Agree with note as above.  MD Noe

## 2019-04-26 NOTE — PLAN OF CARE
Pt delivered baby boy at 0822 with NICU in attendence. Intact placenta at 0834. Baby had pleural effusions with 2 shunts placed. The shunts both were removed upon delivery and given to patient per her request. Laceration was repaired,  ml. Pt is stable and will go to NICU then post partum for recovery.

## 2019-04-26 NOTE — PLAN OF CARE
Pt VSS. Afebrile. Receiving PO misoprostol for cervical ripening s/p 5 doses. Angelina too frequently at this time for next dose- will administer once ctx space. Frazier bulb placed by Dr. Mai at 2205 with 60cc in uterine balloon. Frequent position changes, hot packs, birthing ball, and asymmetrical lunges for comfort measures at this time. Plans for epidural later. Good support from  at bedside. Continue with current plan of care.    Addendum: Frazier balloon out while patient up to bathroom at 2250. Dr. Mai updated and plan per provider to start IV pitocin. Bedside report to Heather NEWBY.

## 2019-04-27 LAB — HGB BLD-MCNC: 12 G/DL (ref 11.7–15.7)

## 2019-04-27 PROCEDURE — 25000132 ZZH RX MED GY IP 250 OP 250 PS 637: Performed by: STUDENT IN AN ORGANIZED HEALTH CARE EDUCATION/TRAINING PROGRAM

## 2019-04-27 PROCEDURE — 85018 HEMOGLOBIN: CPT | Performed by: STUDENT IN AN ORGANIZED HEALTH CARE EDUCATION/TRAINING PROGRAM

## 2019-04-27 PROCEDURE — 12000001 ZZH R&B MED SURG/OB UMMC

## 2019-04-27 RX ORDER — IBUPROFEN 600 MG/1
600 TABLET, FILM COATED ORAL EVERY 6 HOURS PRN
Qty: 40 TABLET | Refills: 0 | Status: SHIPPED | OUTPATIENT
Start: 2019-04-27

## 2019-04-27 RX ORDER — ACETAMINOPHEN 325 MG/1
325-650 TABLET ORAL EVERY 6 HOURS PRN
Qty: 40 TABLET | Refills: 0 | Status: SHIPPED | OUTPATIENT
Start: 2019-04-27

## 2019-04-27 RX ORDER — AMOXICILLIN 250 MG
1 CAPSULE ORAL DAILY
Qty: 40 TABLET | Refills: 0 | Status: SHIPPED | OUTPATIENT
Start: 2019-04-27

## 2019-04-27 RX ORDER — CYCLOBENZAPRINE HCL 5 MG
5 TABLET ORAL 3 TIMES DAILY PRN
Status: DISCONTINUED | OUTPATIENT
Start: 2019-04-27 | End: 2019-04-28 | Stop reason: HOSPADM

## 2019-04-27 RX ADMIN — SENNOSIDES AND DOCUSATE SODIUM 1 TABLET: 8.6; 5 TABLET ORAL at 08:28

## 2019-04-27 RX ADMIN — HYDROCORTISONE: 25 CREAM TOPICAL at 15:29

## 2019-04-27 RX ADMIN — DIPHENHYDRAMINE HYDROCHLORIDE 25 MG: 25 CAPSULE ORAL at 22:03

## 2019-04-27 RX ADMIN — ACETAMINOPHEN 650 MG: 325 TABLET, FILM COATED ORAL at 11:06

## 2019-04-27 RX ADMIN — IBUPROFEN 800 MG: 800 TABLET ORAL at 05:32

## 2019-04-27 RX ADMIN — ACETAMINOPHEN 650 MG: 325 TABLET, FILM COATED ORAL at 03:07

## 2019-04-27 RX ADMIN — IBUPROFEN 800 MG: 800 TABLET ORAL at 11:06

## 2019-04-27 RX ADMIN — IBUPROFEN 800 MG: 800 TABLET ORAL at 23:56

## 2019-04-27 RX ADMIN — ACETAMINOPHEN 650 MG: 325 TABLET, FILM COATED ORAL at 23:56

## 2019-04-27 RX ADMIN — IBUPROFEN 800 MG: 800 TABLET ORAL at 17:50

## 2019-04-27 RX ADMIN — ACETAMINOPHEN 650 MG: 325 TABLET, FILM COATED ORAL at 14:49

## 2019-04-27 RX ADMIN — ACETAMINOPHEN 650 MG: 325 TABLET, FILM COATED ORAL at 07:09

## 2019-04-27 RX ADMIN — ACETAMINOPHEN 650 MG: 325 TABLET, FILM COATED ORAL at 20:10

## 2019-04-27 NOTE — LACTATION NOTE
"D: Parul states she is normally in good health, takes no medications, and has no history of breast/chest surgery or trauma. This baby is her second child. She  her daughter for 14 months and described a full supply. She used a nipple shield; baby had ULT which she had revised by ENT. She has already started to pump.   I:  I gave her a folder of introductory materials, reviewed physiology of colostrum and milk production, pumping guidelines, and I gave her a log and encouraged her to use it.  I explained how to access the videos \"Hand Expression\" and \"Maximizing Milk Production\"; as well as other helpful books and websites.  We discussed hands-on pumping techniques and usefulness of a hands-free pumping bra.  We discussed skin to skin holding and how to reach breastfeeding goals.  We talked about medications during breastfeeding.  She verbalized understanding. She has pump coverage through her insurance company.    A:  Mom has information she needs to initiate her supply.   P:  Will continue to provide lactation support.  Dora Oakes, RNC, IBCLC               "

## 2019-04-27 NOTE — PROVIDER NOTIFICATION
04/26/19 2020   Provider Notification   Provider Name/Title Dr. Mai    Method of Notification Electronic Page   Request Evaluate-Remote   Notification Reason Medication Request   Patient requesting Benadryl to help with sleep tonight.

## 2019-04-27 NOTE — PLAN OF CARE
Stable postpartum. Medication given for uterine cramping. Independent with cares. Pumping and collecting drops of colostrum. Visits baby in the NICU frequently.

## 2019-04-27 NOTE — PLAN OF CARE
Visiting baby in NICU with spouse several times this evening. States she is hopeful and his prognosis, and has informed herself by reading and asking questions. States perineal and uterine discomfort is adequately managed with tub soak, tylenol and ibuprofen, cold and warm packs, and tucks. Using breast pump independently and regularly. Plans to talk with NICU lactation about recommended pump for discharge. Blood pressure readings 130's/80's this evening, and HELLP labs are stable. Has 1+ edema in feet and ankles, but denies other signs/symptoms of preeclampsia.

## 2019-04-27 NOTE — PLAN OF CARE
Data: Vital signs within normal limits. Postpartum checks within normal limits - see flow record. Patient eating and drinking normally. Patient able to empty bladder independently and is up ambulating. Patient performing self cares and is able to care for infant.  Action: Patient medicated during the shift for uterine cramping, especially while pumping - also using hot packs for relief.  Appears to be coping well with  status in NICU.  Will continue to monitor and provide support.

## 2019-04-27 NOTE — PROGRESS NOTES
Post Partum Progress Note  PPD#1    Subjective:  She is resting comfortably in bed this morning. Pain is improving and well controlled on current medication regimen. She is tolerating PO intake. Lochia present and minimal.  She is voiding without difficulty. She has passed flatus and has no had a BM. She is ambulating without dizziness or difficulty. She has made a few trips to the NICU.  She denies headache, changes in vision, nausea/vomiting, chest pain, shortness of breath, RUQ pain, or worsening edema.  Plans to breast feed. Patient considering discharge home today.     Objective:  Vitals:    19 1611 19 0300 19 0800   BP: 130/87 130/85 (!) 128/94 131/77   Pulse:  81  88   Resp: 18 18 16 16   Temp: 97.4  F (36.3  C) 97.9  F (36.6  C) 98  F (36.7  C) 98  F (36.7  C)   TempSrc: Oral Oral Oral Oral   SpO2:       Weight:       Height:           General: NAD, resting comfortably  CV: Regular rate, well perfused.   Pulm: Normal respiratory effort.  Abd: Soft, non-tender, non-distended. Fundus is firm and 1 cm below the umbilicus.    Ext: Trace lower extremity edema bilaterally. No calf tenderness.    Assessment/Plan:  Parul Herrera is a 33 year old  female who is  PPD#1 s/p . Pregnancy was complicated by fetus with bilateral pleural effusions requiring stents, and gestational hypertension.    - Encourage routine post-operative goals including ambulation and incentive spirometry  - PNC: Rh positive. Rubella immune. No intervention indicated.  - Pain: controlled on oral medications  - Heme: Hgb 12.6>>AM Hgb pending.  If <10 will discharge home with iron.  - GI: continue anti-emetics and stool softeners as needed.  - : Voiding spontaneously.  - Infant: In NICU, per mom doing well  - Feeding: Plans on breastfeeding.  - BC: Undecided, interested in long acting option.    GHTN: BP normotensive to low mild range. No symptoms preE. BP check in one week after  discharge.    Discharge to home on PPD#1-2      Erika Edwards MD  Obstetrics and Gyncology, PGY-1  April 27, 2019 , 7:23 AM     Appreciate note by Dr. Edwards. Patient has been seen and examined by me separate from the resident, agree with above note. Will continue to monitor BP in setting of GHTN until PPD#2.     Francine Alexis MD  11:22 AM

## 2019-04-28 VITALS
SYSTOLIC BLOOD PRESSURE: 133 MMHG | HEIGHT: 68 IN | BODY MASS INDEX: 28.19 KG/M2 | RESPIRATION RATE: 18 BRPM | WEIGHT: 186 LBS | DIASTOLIC BLOOD PRESSURE: 80 MMHG | TEMPERATURE: 98.1 F | HEART RATE: 84 BPM | OXYGEN SATURATION: 97 %

## 2019-04-28 PROBLEM — Z34.90 ENCOUNTER FOR INDUCTION OF LABOR: Status: ACTIVE | Noted: 2019-04-28

## 2019-04-28 PROCEDURE — 25000132 ZZH RX MED GY IP 250 OP 250 PS 637: Performed by: STUDENT IN AN ORGANIZED HEALTH CARE EDUCATION/TRAINING PROGRAM

## 2019-04-28 RX ADMIN — ACETAMINOPHEN 650 MG: 325 TABLET, FILM COATED ORAL at 10:46

## 2019-04-28 RX ADMIN — IBUPROFEN 800 MG: 800 TABLET ORAL at 06:05

## 2019-04-28 RX ADMIN — ACETAMINOPHEN 650 MG: 325 TABLET, FILM COATED ORAL at 06:05

## 2019-04-28 NOTE — PROVIDER NOTIFICATION
04/28/19 0200   Provider Notification   Provider Name/Title Dr. Man    Method of Notification Phone   Notification Reason Other (Comment)  (patient passed blood clot that looks like placenta fragment )

## 2019-04-28 NOTE — PROGRESS NOTES
Post Partum Progress Note  PPD#1    Subjective:  Parul is doing well this morning. Had increased uterine cramping overnight and passed a plum-sized portion of placenta. Since then cramping has improved. Bleeding less than menses. Eating, voiding and ambulating without any issues. She has passed flatus and has no had a BM. She denies headache, changes in vision, nausea/vomiting, chest pain, shortness of breath, RUQ pain, or worsening edema. Breast pumping.    Objective:  Vitals:    19 1450 19 1525 19 2200 19 2353   BP: 123/81 127/76 129/77 133/83   Pulse: 81 84 88 84   Resp:  16 18 16   Temp:  97.6  F (36.4  C) 97.9  F (36.6  C) 98.1  F (36.7  C)   TempSrc:  Oral Oral Oral   SpO2:       Weight:       Height:         General: NAD, resting comfortably  CV: well-perfused   Pulm: Normal respiratory effort.  Abd: Soft, non-tender, non-distended. Fundus is firm and 1 cm below the umbilicus.    Ext: Trace lower extremity edema bilaterally. No calf tenderness.    Assessment/Plan:  Parul Herrera is a 33 year old  female who is  PPD#2 s/p . Pregnancy was complicated by fetus with bilateral pleural effusions requiring stents, and gestational hypertension. Meeting goals for discharge.    - Encourage routine post-operative goals including ambulation and incentive spirometry  - PNC: Rh positive. Rubella immune. No intervention indicated.  - Pain: controlled on oral medications  - Retained placenta passed spontaneously  - Heme: Hgb 12.6>>12.0  - GI: continue anti-emetics and stool softeners as needed.  - : Voiding spontaneously.  - Infant: In NICU, per mom doing well  - Feeding: Plans on breastfeeding.  - BC: Undecided, interested in long acting option.    GHTN: BP normotensive to low mild range. No symptoms preE. BP check in one week after discharge.    Discharge to home today    Antwon Man MD  OB/GYN Resident, PGY-3  2019     OB/GYN Staff -- Pt seen and examined by me. Agree with  note as above.  MD Noe

## 2019-04-28 NOTE — PLAN OF CARE
Visiting baby Breezy in NICU several times this evening. Ambulating to and/or from NICU. States perineal and uterine discomfort is managed with regular tylenol, ibuprofen, use of cold and warm packs, tucks, and hydrocortisone cream. Hopeful for discharge to boarding room tomorrow, and states NICU lactation will dispense her breast pump for home.

## 2019-04-28 NOTE — PLAN OF CARE
VSS. BP's 130/80's. Pain well controlled with Tylenol and Ibuprofen. Pt pumping for infant in the NICU. Postpartum checks WNL. Pt ready to discharge to home. Discharge instructions and medications reviewed with patient. Pt verbalized understanding with no questions asked. Pt discharged to home, accompanied by spouse. All belongings taken with patient.

## 2019-04-28 NOTE — PLAN OF CARE
Called to room by patient, patient states she passed a large blood clot.  Plum size blood clot noted that looked like placenta fragment and was firm to touch.  Dr. Man paged and updated on clot, order to continue to watch bleeding and report if bleeding increased and is heavy.  Dr. Man will look at clot this morning when rounding.

## 2019-04-28 NOTE — PLAN OF CARE
Patient VSS, FF U/U with small lochia. Patient passed blood clot tonight that looked like placenta fragment, Dr. Man aware. Patient pumping every 2-3 hours independently, no colostrum/breast milk collected yet.  Encouraged patient to continue to pump.  Patient taking Tylenol and Ibuprofen for pain and states that it is helping.  Patient ambulated to NICU to see baby and is independent with self cares.  Anticipate discharge today.  Continue with current plan of care.

## 2019-05-02 ENCOUNTER — OFFICE VISIT (OUTPATIENT)
Dept: MATERNAL FETAL MEDICINE | Facility: CLINIC | Age: 34
End: 2019-05-02
Attending: INTERNAL MEDICINE
Payer: COMMERCIAL

## 2019-05-02 ENCOUNTER — TELEPHONE (OUTPATIENT)
Dept: MATERNAL FETAL MEDICINE | Facility: CLINIC | Age: 34
End: 2019-05-02

## 2019-05-02 VITALS
SYSTOLIC BLOOD PRESSURE: 142 MMHG | DIASTOLIC BLOOD PRESSURE: 92 MMHG | HEART RATE: 69 BPM | WEIGHT: 179.3 LBS | RESPIRATION RATE: 16 BRPM | BODY MASS INDEX: 27.26 KG/M2

## 2019-05-02 DIAGNOSIS — O16.9 HYPERTENSION IN PREGNANCY, DELIVERED: ICD-10-CM

## 2019-05-02 DIAGNOSIS — O16.9 HYPERTENSION IN PREGNANCY, DELIVERED: Primary | ICD-10-CM

## 2019-05-02 PROCEDURE — G0463 HOSPITAL OUTPT CLINIC VISIT: HCPCS | Mod: ZF

## 2019-05-02 NOTE — TELEPHONE ENCOUNTER
Parul calling to schedule BP check today now 1 week PP. C/o swollen ankles. Baby in NICU. Will come over between feeds.    Yoselyn Juarez RN

## 2019-05-02 NOTE — NURSING NOTE
Parul seen in clinic today for BP now 6 days post vaginal delivery at (see report/notes). Mild GHTN at the end of pregnancy. BP today 142/92. Pt denies headache/vision changes/chest pain/SOB. Pt has considerable amounts of edema in lower extremities, hands, and face. Vaginal bleeding minimal. Mood and appetite good. Staying in the NICU with son. Dr. Alcazar met with pt and discussed POC. Plan to check BP in 1 week.  Pt encouraged to call with changes in symptoms or signs of preeclampsia, SOB, CP.  Pt discharged stable and ambulatory.     Yoselyn Juarez RN

## 2019-05-02 NOTE — PROGRESS NOTES
PP MFM Progress Note    S: Parul is here today for a PP blood pressure check. Parul had an  at 37 1/7 weeks gestation. Her pregnancy was complicated by bilateral fetal pleural effusions requiring bilateral shunt placement, as well as gestational hypertensions. She reports that her son is doing well in the NICU and working on increasing the volume of feeds with close surveillance of the pleural effusions. Parul's only complaint is of increased lower extremity swelling. She denies any HA, vision changes, abdominal pain or shortness of breath.     O:  BP (!) 142/92 (BP Location: Left arm, Patient Position: Chair)   Pulse 69   Resp 16   Wt 81.3 kg (179 lb 4.8 oz)   LMP 2018   BMI 27.26 kg/m    Gen: NAD  Ext: Significant dependant edema    A/P: 33 year old  PPD#6 from pregnancy complicated by fetal bilateral chylothorax and gestational hypertension. Blood pressure remains within the mild range today without any other signs or symptoms of preeclampsia. No indication for any intervention or treatment at this time.    A follow up blood pressure check is recommended in on week. If Parul's son remains in the NICU she will follow up in our office. If she is discharged home she may check her blood pressure at home or at primary OB office. Follow up with primary OB six weeks postpartum.    Michelle Amaya MD  Specialist in Maternal-Fetal Medicine    TT:10 min  FTF: 10

## 2019-05-06 NOTE — PROGRESS NOTES
2019      Yoselyn Cat MD   Maternal Fetal Medicine   [address]   [place] [address]      RE: Parul Herrera   MRN: [id number]   : 1985      Dear Dr. Cat:      I had the opportunity to see Parul Herrera today at the [facility].  As you will recall, she is a delightful 33-year-old female who I was asked to see in consultation today as her developing child has undergone fetal intervention for congenital hydrothoraces as I understand.  This was done at [facility] and she had bilateral thoracoamniotic shunts placed.  I do not have the records for my review today.  Parul is accompanied by her father to today's visit.  She and her , Alpesh, are expecting a boy.  Estimated gestational age is presently 34-1/2 weeks.  EDC May 2019, but the delivery date has been changed to 2019 presently as she is being closely monitored by our MFM group.      We had a pleasant conversation today.  This is a well-educated family.  We are pleased to see that she is doing well at this point.  By her account, the left shunt has not been working, the right shunt was functional.  The latest ultrasound demonstrates no acute issues otherwise.  We reviewed the latest imaging/child's status during our visit today.  Parul reports that she herself is doing well.  She is having no significant concerns.  She is in good spirits, good health and has no complaints. Her hydration has been good.  No recent illnesses.      PAST MEDICAL HISTORY:  Suggests that she is healthy.  She is currently staying at home with her father.  There is a 2-year-old daughter, Bing, as I understand.      PAST SURGICAL HISTORY:  Unremarkable.     MEDICATIONS:  Prenatal vitamins.      ALLERGIES:  She has no allergies.      PHYSICAL EXAMINATION:  She appears well.  My exam was limited given the nature of our visit to that of a counseling role.  She is a most pleasant person and in no acute  distress.  She appears sufficiently hydrated and nourished.     FAMILY HISTORY:  No bleeding, clotting disorders or issues with Anesthesia.     SOCIAL HISTORY:  She is an  (fifth grade).      IMPRESSION AND PLAN:  It was a pleasure to meet with Parul and her family today in [facility].  She and her father asked very insightful questions.  In general, we spoke about the anticipated  course.  I explained that that would be predicated on the child's condition once he is born; we will closely monitor perinatally and and we can simply follow along.  There is a possibility he will warrant respiratory support, with intubation and ventilation as needed.  If his  clinical course goes well, no intervention at all may be warranted.  We will have to follow progress of the initiation of diet and effusions may need to be drained.  We spoke in broad terms about conservative management, but I remain optimistic that they will do just fine.  With respect to thoracoamniotic shunts, these ought to be visible at the time of delivery.  Sometimes there are retained within the thorax and may accordingly warrant removal at some point electively.  That will be assessed clinically at the time of the child's delivery with our [facility] and Neonatology colleagues.        Thank you for allowing me to participate in her care.  The family remains comfortable with this plan.  I have provided my information and would be gladly available if any concerns arise.  I look forward to a positive report and meeting their son if warranted.       ADDENDUM:  I was pleased to see that Parul delivered successfully on 2019 with her son at 37 weeks.  By report things went well and there were no  complications.  We were not notified of the child's arrival accordingly.  Please do not hesitate to give me a call if there are additional questions or concerns.          I spent 20 minutes providing  care, greater than 50% counseling.      Kind regards,         Ivan Bonilla MD, PhD   Pediatric Surgery  [facility]       cc:   Marce Herrera   [address] [place] [address]      Nanci Alcazar, DO   [facility] Maternal Fetal Medicine   [address], [address]   [place] [address]

## 2019-07-23 LAB
LOCATION PERFORMED: NORMAL
RESULT: NORMAL
SEND OUTS MISC TEST CODE: NORMAL
SEND OUTS MISC TEST SPECIMEN: NORMAL
TEST NAME: NORMAL

## (undated) DEVICE — SPONGE LAP 18X18" 1515

## (undated) DEVICE — Device

## (undated) DEVICE — DRAPE STERI TOWEL LG 1010

## (undated) DEVICE — SU DERMABOND DHV12

## (undated) DEVICE — NDL ECLIPSE 22GA 1.5"

## (undated) DEVICE — BASIN SET MAJOR

## (undated) DEVICE — TRAY AMNIOCENTESIS LATEX

## (undated) DEVICE — LIGHT HANDLE X2

## (undated) DEVICE — SYR 10ML LL W/O NDL

## (undated) DEVICE — NDL ECLIPSE 18GA 1.5"

## (undated) DEVICE — NDL COUNTER 20CT 31142493

## (undated) DEVICE — BLADE KNIFE SURG 11 371111

## (undated) DEVICE — DRAPE C-SECTION W/INCISE POUCH 100" 7965CA

## (undated) DEVICE — DRAPE MAYO STAND 23X54 8337

## (undated) RX ORDER — FENTANYL CITRATE 50 UG/ML
INJECTION, SOLUTION INTRAMUSCULAR; INTRAVENOUS
Status: DISPENSED
Start: 2019-03-26